# Patient Record
Sex: FEMALE | Race: OTHER | Employment: FULL TIME | URBAN - METROPOLITAN AREA
[De-identification: names, ages, dates, MRNs, and addresses within clinical notes are randomized per-mention and may not be internally consistent; named-entity substitution may affect disease eponyms.]

---

## 2018-07-17 ENCOUNTER — OFFICE VISIT (OUTPATIENT)
Dept: FAMILY MEDICINE CLINIC | Facility: CLINIC | Age: 43
End: 2018-07-17
Payer: COMMERCIAL

## 2018-07-17 VITALS
BODY MASS INDEX: 51.44 KG/M2 | RESPIRATION RATE: 17 BRPM | HEIGHT: 61.5 IN | DIASTOLIC BLOOD PRESSURE: 84 MMHG | HEART RATE: 91 BPM | OXYGEN SATURATION: 98 % | SYSTOLIC BLOOD PRESSURE: 132 MMHG | TEMPERATURE: 98 F | WEIGHT: 276 LBS

## 2018-07-17 DIAGNOSIS — Z13.29 SCREENING FOR ENDOCRINE, NUTRITIONAL, METABOLIC AND IMMUNITY DISORDER: ICD-10-CM

## 2018-07-17 DIAGNOSIS — Z80.3 FAMILY HISTORY OF BREAST CANCER: ICD-10-CM

## 2018-07-17 DIAGNOSIS — Z00.00 ENCOUNTER FOR ANNUAL PHYSICAL EXAMINATION EXCLUDING GYNECOLOGICAL EXAMINATION IN A PATIENT OLDER THAN 17 YEARS: Primary | ICD-10-CM

## 2018-07-17 DIAGNOSIS — Z12.39 SCREENING FOR BREAST CANCER: ICD-10-CM

## 2018-07-17 DIAGNOSIS — K64.9 HEMORRHOIDS, UNSPECIFIED HEMORRHOID TYPE: ICD-10-CM

## 2018-07-17 DIAGNOSIS — Z13.0 SCREENING FOR ENDOCRINE, NUTRITIONAL, METABOLIC AND IMMUNITY DISORDER: ICD-10-CM

## 2018-07-17 DIAGNOSIS — Z13.21 SCREENING FOR ENDOCRINE, NUTRITIONAL, METABOLIC AND IMMUNITY DISORDER: ICD-10-CM

## 2018-07-17 DIAGNOSIS — E66.01 OBESITY, MORBID, BMI 50 OR HIGHER (HCC): ICD-10-CM

## 2018-07-17 DIAGNOSIS — Z13.228 SCREENING FOR ENDOCRINE, NUTRITIONAL, METABOLIC AND IMMUNITY DISORDER: ICD-10-CM

## 2018-07-17 PROCEDURE — 99396 PREV VISIT EST AGE 40-64: CPT | Performed by: EMERGENCY MEDICINE

## 2018-07-17 NOTE — PROGRESS NOTES
Ary Wilkerson is a 39year old female who presents for a complete physical exam.   HPI:     Patient presents with:  Physical: Annual physical       Age: 39    1First day of last menstrual period (or first year of         menstruation, if throug f. Does your house have a working smoke detector? YES        g. Do you have firearms at home? NO        h. Have you ever had a mammogram?  NO     If yes, date of last mammogram:        i. How many sexual partners have you  had in the last 12 months?  1 Smokeless tobacco: Never Used                      Alcohol use: Yes              Comment: soc           REVIEW OF SYSTEMS:   GENERAL HEALTH: feels well, no fatigue.   SKIN: denies any unusual skin lesions or muscles tones, no joints abnormalities. SKIN: Normal color, turgor, no lesions, rashes or wounds. PSYCH: normal affect and mood.     ASSESSMENT AND PLAN:     Encounter for annual physical examination excluding gynecological examination in a patient older unspecified hemorrhoid type  -     SURGERY - INTERNAL    Other orders  -     Cancel: GALLO SCREENING BILAT (CPT=77067);  Future      · Arrange for genetic counseling  · Arrange for mammogram  · Have blood tests done  · Follow up with Surgery for hemorroids  ·

## 2018-07-17 NOTE — PATIENT INSTRUCTIONS
Thank you for choosing [facility]  To Do:  [facility]    · Arrange for genetic counseling  · Arrange for mammogram  · Have blood tests done  · Follow up with Surgery for hemorroids  · Follow up with weight loss clinic  · Overall decr make calcium on its own. That's why it's important to eat calcium-rich foods. Some foods are naturally rich in calcium. Others have calcium added (fortified). It's best to get calcium from the foods you eat.  But if you can't get enough, you may want to galileo guidelines for these, for women ages 36 to 52. Talk with your healthcare provider to make sure you’re up-to-date on what you need.   Screening Who needs it How often   Type 2 diabetes or prediabetes All adults beginning at age 39 and adults without symptoms exam at age 36 and eye exams every 2 to 4 years. If you have a chronic disease, ask your healthcare provider how often you should have your eyes examined. 4   Vaccine Who needs it How often   Chickenpox (varicella) All women in this age group who have no re Sexually transmitted infection prevention Women at increased risk for infection – talk with your healthcare provider At routine exams   Use of tobacco and the health effects it can cause All women in this age group Every exam   1[employer]

## 2018-07-18 ENCOUNTER — LAB ENCOUNTER (OUTPATIENT)
Dept: LAB | Age: 43
End: 2018-07-18
Attending: EMERGENCY MEDICINE
Payer: COMMERCIAL

## 2018-07-18 DIAGNOSIS — Z13.228 SCREENING FOR ENDOCRINE, NUTRITIONAL, METABOLIC AND IMMUNITY DISORDER: ICD-10-CM

## 2018-07-18 DIAGNOSIS — Z13.29 SCREENING FOR ENDOCRINE, NUTRITIONAL, METABOLIC AND IMMUNITY DISORDER: ICD-10-CM

## 2018-07-18 DIAGNOSIS — Z13.0 SCREENING FOR ENDOCRINE, NUTRITIONAL, METABOLIC AND IMMUNITY DISORDER: ICD-10-CM

## 2018-07-18 DIAGNOSIS — E66.01 OBESITY, MORBID, BMI 50 OR HIGHER (HCC): ICD-10-CM

## 2018-07-18 DIAGNOSIS — Z13.21 SCREENING FOR ENDOCRINE, NUTRITIONAL, METABOLIC AND IMMUNITY DISORDER: ICD-10-CM

## 2018-07-18 LAB
ALBUMIN SERPL-MCNC: 3.4 G/DL (ref 3.5–4.8)
ALP LIVER SERPL-CCNC: 59 U/L (ref 37–98)
ALT SERPL-CCNC: 30 U/L (ref 14–54)
AST SERPL-CCNC: 14 U/L (ref 15–41)
BASOPHILS # BLD AUTO: 0.03 X10(3) UL (ref 0–0.1)
BASOPHILS NFR BLD AUTO: 0.4 %
BILIRUB SERPL-MCNC: 0.6 MG/DL (ref 0.1–2)
BUN BLD-MCNC: 12 MG/DL (ref 8–20)
CALCIUM BLD-MCNC: 9.3 MG/DL (ref 8.3–10.3)
CHLORIDE: 106 MMOL/L (ref 101–111)
CHOLEST SMN-MCNC: 180 MG/DL (ref ?–200)
CO2: 28 MMOL/L (ref 22–32)
CREAT BLD-MCNC: 0.79 MG/DL (ref 0.55–1.02)
EOSINOPHIL # BLD AUTO: 0.17 X10(3) UL (ref 0–0.3)
EOSINOPHIL NFR BLD AUTO: 2 %
ERYTHROCYTE [DISTWIDTH] IN BLOOD BY AUTOMATED COUNT: 13.1 % (ref 11.5–16)
EST. AVERAGE GLUCOSE BLD GHB EST-MCNC: 148 MG/DL (ref 68–126)
GLUCOSE BLD-MCNC: 107 MG/DL (ref 70–99)
HBA1C MFR BLD HPLC: 6.8 % (ref ?–5.7)
HCT VFR BLD AUTO: 44 % (ref 34–50)
HDLC SERPL-MCNC: 50 MG/DL (ref 45–?)
HDLC SERPL: 3.6 {RATIO} (ref ?–4.44)
HGB BLD-MCNC: 13.7 G/DL (ref 12–16)
IMMATURE GRANULOCYTE COUNT: 0.03 X10(3) UL (ref 0–1)
IMMATURE GRANULOCYTE RATIO %: 0.4 %
LDLC SERPL CALC-MCNC: 111 MG/DL (ref ?–130)
LYMPHOCYTES # BLD AUTO: 2.06 X10(3) UL (ref 0.9–4)
LYMPHOCYTES NFR BLD AUTO: 24.7 %
M PROTEIN MFR SERPL ELPH: 7.7 G/DL (ref 6.1–8.3)
MCH RBC QN AUTO: 28.1 PG (ref 27–33.2)
MCHC RBC AUTO-ENTMCNC: 31.1 G/DL (ref 31–37)
MCV RBC AUTO: 90.3 FL (ref 81–100)
MONOCYTES # BLD AUTO: 0.47 X10(3) UL (ref 0.1–1)
MONOCYTES NFR BLD AUTO: 5.6 %
NEUTROPHIL ABS PRELIM: 5.59 X10 (3) UL (ref 1.3–6.7)
NEUTROPHILS # BLD AUTO: 5.59 X10(3) UL (ref 1.3–6.7)
NEUTROPHILS NFR BLD AUTO: 66.9 %
NONHDLC SERPL-MCNC: 130 MG/DL (ref ?–130)
PLATELET # BLD AUTO: 311 10(3)UL (ref 150–450)
POTASSIUM SERPL-SCNC: 4.3 MMOL/L (ref 3.6–5.1)
RBC # BLD AUTO: 4.87 X10(6)UL (ref 3.8–5.1)
RED CELL DISTRIBUTION WIDTH-SD: 43.5 FL (ref 35.1–46.3)
SODIUM SERPL-SCNC: 142 MMOL/L (ref 136–144)
TRIGL SERPL-MCNC: 94 MG/DL (ref ?–150)
TSI SER-ACNC: 2.98 MIU/ML (ref 0.35–5.5)
VLDLC SERPL CALC-MCNC: 19 MG/DL (ref 5–40)
WBC # BLD AUTO: 8.4 X10(3) UL (ref 4–13)

## 2018-07-18 PROCEDURE — 83036 HEMOGLOBIN GLYCOSYLATED A1C: CPT | Performed by: EMERGENCY MEDICINE

## 2018-07-18 PROCEDURE — 36415 COLL VENOUS BLD VENIPUNCTURE: CPT | Performed by: EMERGENCY MEDICINE

## 2018-07-18 PROCEDURE — 80061 LIPID PANEL: CPT | Performed by: EMERGENCY MEDICINE

## 2018-07-18 PROCEDURE — 80050 GENERAL HEALTH PANEL: CPT | Performed by: EMERGENCY MEDICINE

## 2018-07-20 ENCOUNTER — TELEPHONE (OUTPATIENT)
Dept: FAMILY MEDICINE CLINIC | Facility: CLINIC | Age: 43
End: 2018-07-20

## 2018-07-20 NOTE — TELEPHONE ENCOUNTER
----- Message from Meng Clayton MD sent at 7/19/2018  5:19 PM CDT -----  Pt needs OV for discussion of abnormal labs

## 2018-07-23 ENCOUNTER — OFFICE VISIT (OUTPATIENT)
Dept: FAMILY MEDICINE CLINIC | Facility: CLINIC | Age: 43
End: 2018-07-23
Payer: COMMERCIAL

## 2018-07-23 VITALS
BODY MASS INDEX: 51 KG/M2 | SYSTOLIC BLOOD PRESSURE: 126 MMHG | RESPIRATION RATE: 16 BRPM | HEART RATE: 86 BPM | WEIGHT: 275 LBS | OXYGEN SATURATION: 96 % | DIASTOLIC BLOOD PRESSURE: 88 MMHG

## 2018-07-23 DIAGNOSIS — E66.01 OBESITY, MORBID, BMI 50 OR HIGHER (HCC): ICD-10-CM

## 2018-07-23 DIAGNOSIS — E11.9 NEW ONSET TYPE 2 DIABETES MELLITUS (HCC): Primary | ICD-10-CM

## 2018-07-23 DIAGNOSIS — Z86.39 HISTORY OF HYPOTHYROIDISM: ICD-10-CM

## 2018-07-23 PROCEDURE — 99214 OFFICE O/P EST MOD 30 MIN: CPT | Performed by: EMERGENCY MEDICINE

## 2018-07-23 NOTE — PATIENT INSTRUCTIONS
Thank you for choosing [facility]  To Do:  FOR [address]    · Repeat thyroid test in 6 months  · Start Metformin 500 mg twice a day  · Daily blood sugar testing  · Arrange for diabetic eye exam  · Arrange for Diabetic teaching, Jose Geller of exercise  Exercise offers many benefits including:   · Exercise increases your metabolism (the speed at which your body burns calories). · Regular exercise can increase the amount of muscle in your body. Muscle burns calories faster than fat.  The more reasonable (or possible).   Make an action plan  Habits don’t change overnight. Setting your goals too high can leave you feeling discouraged if you can’t reach them. Be realistic. Choose one or two small changes you can make now.  Set an action plan for ho lose a 1/2 pound to 2 pounds a week. Then you’re more likely to lose fat rather than water or muscle. Fiction: “Skipping meals will help me lose weight.”  Fact: When you skip meals, you don’t give your body the energy it needs to work.  Hunger makes you mo your healthcare professional's instructions. How to Check Your Blood Sugar    Keeping track of how much sugar (glucose) is in your blood is an important part of self-care when you have diabetes.  This is also called self-monitoring of blood glucose ( glucose): Less than 180 mg/dL. Track your readings  Use a notebook, chart, or log book to keep track of your readings. Write down the date, time, and your blood sugar level numbers.  This helps you see patterns, such as high blood sugar after eating certai instructions. Exercise to Manage Your Blood Sugar    Being physically active every day can help you manage your blood sugar. That’s because an active lifestyle can improve your body’s ability to use insulin.  Daily activity can also help delay or pre exercise (also called strength training), makes muscles stronger. It also helps muscles use insulin better. Ask your healthcare provider whether this type of exercise is right for you.  If it is, your healthcare provider can help you work it in to your acti 6/1/2016  © 2674-8124 [employer] [address]. 1407 [facility], [address] [place] [address]. All rights reserved. This information is not intended as a substitute for professional medical care. Always follow your healthcare professional's instructions. your numbers. Also use your log to record things that might have affected your blood sugar.  Some examples include being sick, certain medicines, being physically active, feeling stressed, or skipping meals.   Lessons learned from your readings  Tracking yo

## 2018-07-23 NOTE — PROGRESS NOTES
Chief Complaint:   Patient presents with:  Lab Results: f/u    HPI:   This is a 39year old female     [address] [facility]  HBA1C elevated on last OV, 6.8  No polyuria   No weight loss  No polydipsia. Complains of generalized fatigue and decreased energy. bilateral  NECK: supple, no adenopathy, no thyromegaly  LUNGS: clear to auscultation, no RRW  CARDIO: RRR without murmur  EXTREMITIES: no cyanosis, clubbing or edema        Recent Results (from the past 336 hour(s))  -COMP METABOLIC PANEL (14)   Collection 1.00 x10(3) uL   Eosinophil Absolute 0.17 0.00 - 0.30 x10(3) uL   Basophil Absolute 0.03 0.00 - 0.10 x10(3) uL   Immature Granulocyte Absolute 0.03 0.00 - 1.00 x10(3) uL   Neutrophil % 66.9 %   Lymphocyte % 24.7 %   Monocyte % 5.6 %   Eosinophil % 2.0 %

## 2018-08-08 ENCOUNTER — OFFICE VISIT (OUTPATIENT)
Dept: SURGERY | Facility: CLINIC | Age: 43
End: 2018-08-08
Payer: COMMERCIAL

## 2018-08-08 VITALS — WEIGHT: 275 LBS | HEIGHT: 62 IN | BODY MASS INDEX: 50.61 KG/M2 | HEART RATE: 85 BPM | TEMPERATURE: 98 F

## 2018-08-08 DIAGNOSIS — K64.4 EXTERNAL HEMORRHOIDS WITHOUT COMPLICATION: Primary | ICD-10-CM

## 2018-08-08 PROCEDURE — 46600 DIAGNOSTIC ANOSCOPY SPX: CPT | Performed by: SURGERY

## 2018-08-08 PROCEDURE — 99243 OFF/OP CNSLTJ NEW/EST LOW 30: CPT | Performed by: SURGERY

## 2018-08-08 NOTE — H&P
New Patient Visit Note       Active Problems      1. External hemorrhoids without complication        Chief Complaint   Patient presents with:  Anal / Rectal Problem: New patient referred by Dr. Opal Beasley for possible hemorrhoids. c/o lump near anus.  Denies Smokeless tobacco: Never Used                        Alcohol use: Yes                Comment: soc    Drug use:  No                 Current Outpatient Prescriptions:  MetFORMIN HCl 500 MG Oral Tab Take 1 tablet ( rhonchi. She has no rales. Genitourinary: Rectal exam shows external hemorrhoid (nonthrombosed, about 4 mm, at 2:00).  Rectal exam shows no internal hemorrhoid, no fissure, no mass, no tenderness and anal tone normal. Pelvic exam was performed with patien

## 2018-08-09 ENCOUNTER — OFFICE VISIT (OUTPATIENT)
Dept: INTERNAL MEDICINE CLINIC | Facility: CLINIC | Age: 43
End: 2018-08-09
Payer: COMMERCIAL

## 2018-08-09 VITALS
HEIGHT: 62 IN | BODY MASS INDEX: 49.32 KG/M2 | RESPIRATION RATE: 16 BRPM | HEART RATE: 70 BPM | SYSTOLIC BLOOD PRESSURE: 118 MMHG | WEIGHT: 268 LBS | DIASTOLIC BLOOD PRESSURE: 80 MMHG

## 2018-08-09 DIAGNOSIS — E66.01 OBESITY, MORBID, BMI 50 OR HIGHER (HCC): ICD-10-CM

## 2018-08-09 DIAGNOSIS — E03.9 ACQUIRED HYPOTHYROIDISM: ICD-10-CM

## 2018-08-09 DIAGNOSIS — Z51.81 THERAPEUTIC DRUG MONITORING: Primary | ICD-10-CM

## 2018-08-09 DIAGNOSIS — E11.10 TYPE 2 DIABETES MELLITUS WITH KETOACIDOSIS WITHOUT COMA, WITHOUT LONG-TERM CURRENT USE OF INSULIN (HCC): ICD-10-CM

## 2018-08-09 PROCEDURE — 99203 OFFICE O/P NEW LOW 30 MIN: CPT | Performed by: NURSE PRACTITIONER

## 2018-08-09 PROCEDURE — 93000 ELECTROCARDIOGRAM COMPLETE: CPT | Performed by: NURSE PRACTITIONER

## 2018-08-09 RX ORDER — PHENTERMINE HYDROCHLORIDE 15 MG/1
15 CAPSULE ORAL EVERY MORNING
Qty: 30 CAPSULE | Refills: 0 | Status: SHIPPED | OUTPATIENT
Start: 2018-08-09 | End: 2018-09-22

## 2018-08-09 NOTE — PROGRESS NOTES
HISTORY OF PRESENT ILLNESS  Patient presents with:  Weight Check: referred by immediate care doctor. no weight loss meds. no regular exercise.     Marietta Rodas is a 39year old female new to our office today for initiation of medical weight loss prog conditions:negative  Migraines/seizures: +headaches  Glaucoma: negative   Depression/anxiety: negative  Constipation: negative  DVT: negative  Family or personal history of Pancreatic issues / Medullary Thyroid Cancer: negative  History of bariatric surger AST 14 (L) 07/18/2018   ALT 30 07/18/2018   BILT 0.6 07/18/2018   TP 7.7 07/18/2018   ALB 3.4 (L) 07/18/2018    07/18/2018   K 4.3 07/18/2018    07/18/2018   CO2 28.0 07/18/2018       Lab Results  Component Value Date    (H) 07/18/2018 10% in 6 months    Abnormal labs: a1c 6.8%  EKG in office completed- Normal axis, sinus bradycarida without any ST or T wave changes QTc 433  Low muscle mass- 12.2%--> strength training exercises encouraged     PLAN   Start exercising  MFP, make appt with

## 2018-08-09 NOTE — PROCEDURES
Procedure: Anoscopy    The patient was draped and exposed in the usual fashion. External visualization of the perineum and gluteal cleft was performed. Digital exam was performed with a well lubricated examining finger.     Diagnostic anoscopy was per

## 2018-09-10 ENCOUNTER — OFFICE VISIT (OUTPATIENT)
Dept: FAMILY MEDICINE CLINIC | Facility: CLINIC | Age: 43
End: 2018-09-10
Payer: COMMERCIAL

## 2018-09-10 VITALS
SYSTOLIC BLOOD PRESSURE: 120 MMHG | RESPIRATION RATE: 16 BRPM | BODY MASS INDEX: 48 KG/M2 | WEIGHT: 260 LBS | DIASTOLIC BLOOD PRESSURE: 84 MMHG | OXYGEN SATURATION: 98 % | HEART RATE: 74 BPM

## 2018-09-10 DIAGNOSIS — R39.89 AIR IN URINE: ICD-10-CM

## 2018-09-10 DIAGNOSIS — E11.9 CONTROLLED TYPE 2 DIABETES MELLITUS WITHOUT COMPLICATION, WITHOUT LONG-TERM CURRENT USE OF INSULIN (HCC): Primary | ICD-10-CM

## 2018-09-10 DIAGNOSIS — E66.01 MORBID OBESITY WITH BMI OF 45.0-49.9, ADULT (HCC): ICD-10-CM

## 2018-09-10 LAB
APPEARANCE: CLEAR
BILIRUBIN: NEGATIVE
GLUCOSE (URINE DIPSTICK): NEGATIVE MG/DL
KETONES (URINE DIPSTICK): NEGATIVE MG/DL
LEUKOCYTES: NEGATIVE
MULTISTIX LOT#: ABNORMAL NUMERIC
NITRITE, URINE: NEGATIVE
PH, URINE: 6 (ref 4.5–8)
PROTEIN (URINE DIPSTICK): NEGATIVE MG/DL
SPECIFIC GRAVITY: 1.03 (ref 1–1.03)
URINE-COLOR: YELLOW
UROBILINOGEN,SEMI-QN: 0.2 MG/DL (ref 0–1.9)

## 2018-09-10 PROCEDURE — 81003 URINALYSIS AUTO W/O SCOPE: CPT | Performed by: EMERGENCY MEDICINE

## 2018-09-10 PROCEDURE — 99214 OFFICE O/P EST MOD 30 MIN: CPT | Performed by: EMERGENCY MEDICINE

## 2018-09-10 PROCEDURE — 87086 URINE CULTURE/COLONY COUNT: CPT | Performed by: EMERGENCY MEDICINE

## 2018-09-10 NOTE — PATIENT INSTRUCTIONS
Thank you for choosing [facility]  To Do:  76 [facility]    · Arrange for DM education, Tomasa Estrella  · Arrange for DM eye exam  · Follow up in 3 months  · Continue follow up with Weight loss clinic  · Continue with weight loss  · Con professional's instructions. Managing Diabetes: The A1C Test       Healthy red blood cells have some glucose stuck to them. A high A1C means that unhealthy amounts of glucose are stuck to the cells.    What is the A1C test?  Using your meter helps yo professional medical care. Always follow your healthcare professional's instructions. Oral Medicines for Type 2 Diabetes  Diabetes pills can help to manage your blood sugar. These pills are not insulin.  They work to manage your blood sugar in severa are taking them.  Possible side effects include:   · Weight gain  · Extra fluid in your body and swelling  · Increased risk for heart failure  · Osteoporosis and increased risk for broken bones  Meglitinides  These pills increase your insulin for a short pe range. They also help your pancreas make more insulin and may help your muscles use insulin more effectively. Side effects depend on which type of combination you use.  Your healthcare provider can tell you more.     Watch for symptoms of hypoglycemia  Burt helps to measure or weigh the food you eat. Because the food label values are based on servings, you’ll need to know how many servings you eat at one sitting. Ounces: 2 to 3 ounces is about the size of your palm.  1 Cup: 1 cup (or a medium-sized piece to walk 10 miles a week), or a health goal (such as lowering your blood pressure). Choose a goal that is measurable and reasonable, so you know when you've reached it.  A goal of reaching a BMI of less than 25 is not always reasonable (or possible).   Make surgeons. These healthcare providers are trained to do surgery that aids in weight loss. A general healthcare provider can offer some treatments for weight loss. But a bariatric healthcare provider has more training in how to treat obesity.  These healthca begin an exercise program  · Find out if you need tests  · Help you make realistic weight loss goals  · Give you a nutrition plan  · Tell you to keep a food diary  · Find out if you need a weight-loss medicine  Your bariatric healthcare provider will also for a healthcare provider in your area. Date Last Reviewed: 4/1/2018  © 4511-4973 [employer] [address]. 1407 [facility], [facility] [place]. All rights reserved. This information is not intended as a substitute for professional medical care.  A

## 2018-09-10 NOTE — PROGRESS NOTES
Chief Complaint:   Patient presents with:  Diabetes: Follow up     HPI:   This is a 39year old female         [address]  Has changed diet and eating less carbs. More mindful in diet. Has not met with DM educator.  Has follwed up with weight loss c or higher ([facility].)     Diabetic acidosis, type II ([facility].)     Therapeutic drug monitoring     Controlled type 2 diabetes mellitus without complication, without long-term current use of insulin ([facility].)     Morbid obesity with BMI of 45.0-49.9, adult ([facility].)        R 10:23 AM   Result Value Ref Range    Urine Culture No Growth at 18-24 hrs.           ASSESSMENT AND PLAN:   Diagnoses and all orders for this visit:    Controlled type 2 diabetes mellitus without complication, without long-term current use of insulin ([facility]

## 2018-09-21 DIAGNOSIS — Z51.81 THERAPEUTIC DRUG MONITORING: ICD-10-CM

## 2018-09-21 DIAGNOSIS — E66.01 OBESITY, MORBID, BMI 50 OR HIGHER (HCC): ICD-10-CM

## 2018-09-21 NOTE — TELEPHONE ENCOUNTER
Requesting phentermine 15 mg  LOV: 8/9.18  RTC: 4 weeks  Last Relevant Labs:   Filled: 8/9/18 #30 with 0 refills    Future Appointments   Date Time Provider Olivia Lemons   10/1/2018  5:40 PM Sigrid Cruz APN [facility]     New patient,

## 2018-09-21 NOTE — TELEPHONE ENCOUNTER
Pt called states she is out of med states she is unable to come in until after 530 or saturdays pt booked appt for 10/1 pt is requesting a refill to last until then  Pt took last pill 2 days ago         Name of Medication: Phentermine HCl 15 MG Oral Cap

## 2018-09-22 RX ORDER — PHENTERMINE HYDROCHLORIDE 15 MG/1
15 CAPSULE ORAL EVERY MORNING
Qty: 30 CAPSULE | Refills: 0 | OUTPATIENT
Start: 2018-09-22 | End: 2018-10-01

## 2018-09-23 NOTE — TELEPHONE ENCOUNTER
[id number] Sammi Vigil, please tell patient that I will refill this medication only this 1 time, and that it will last her until her 10/1 appt  I would also tell her to book like 2 sat ahead of time, since they fill up quickly

## 2018-10-01 ENCOUNTER — TELEPHONE (OUTPATIENT)
Dept: INTERNAL MEDICINE CLINIC | Facility: CLINIC | Age: 43
End: 2018-10-01

## 2018-10-01 ENCOUNTER — OFFICE VISIT (OUTPATIENT)
Dept: INTERNAL MEDICINE CLINIC | Facility: CLINIC | Age: 43
End: 2018-10-01
Payer: COMMERCIAL

## 2018-10-01 VITALS
BODY MASS INDEX: 47.66 KG/M2 | RESPIRATION RATE: 16 BRPM | HEIGHT: 62 IN | WEIGHT: 259 LBS | DIASTOLIC BLOOD PRESSURE: 78 MMHG | HEART RATE: 68 BPM | SYSTOLIC BLOOD PRESSURE: 118 MMHG

## 2018-10-01 DIAGNOSIS — E66.01 OBESITY, MORBID, BMI 50 OR HIGHER (HCC): ICD-10-CM

## 2018-10-01 DIAGNOSIS — E11.9 CONTROLLED TYPE 2 DIABETES MELLITUS WITHOUT COMPLICATION, WITHOUT LONG-TERM CURRENT USE OF INSULIN (HCC): ICD-10-CM

## 2018-10-01 DIAGNOSIS — Z51.81 THERAPEUTIC DRUG MONITORING: Primary | ICD-10-CM

## 2018-10-01 PROCEDURE — 99214 OFFICE O/P EST MOD 30 MIN: CPT | Performed by: NURSE PRACTITIONER

## 2018-10-01 RX ORDER — PHENTERMINE HYDROCHLORIDE 15 MG/1
15 CAPSULE ORAL EVERY MORNING
Qty: 30 CAPSULE | Refills: 0 | Status: CANCELLED | OUTPATIENT
Start: 2018-10-01

## 2018-10-01 RX ORDER — PHENTERMINE HYDROCHLORIDE 37.5 MG/1
37.5 TABLET ORAL
Qty: 30 TABLET | Refills: 0 | Status: SHIPPED | OUTPATIENT
Start: 2018-10-01 | End: 2018-11-10

## 2018-10-01 NOTE — TELEPHONE ENCOUNTER
Called and left message for pt that medication was refilled and suggested that she schedule future appts asap so she can get the days that work for her.

## 2018-10-01 NOTE — TELEPHONE ENCOUNTER
Patient called left vmail stated she was returning albertina call - pt states ok to leave a detailed message

## 2018-10-01 NOTE — PATIENT INSTRUCTIONS
Keep up the great work!!     PLAN:  Continue with medications: metformin take 1,000mg in the AM and 1,000mg at night (2 tablet day and night), phentermine 37.5mg  Go to the lab for blood work   Follow up with me in 1 month  Schedule follow up appointments: your daily life.

## 2018-10-01 NOTE — PROGRESS NOTES
HISTORY OF PRESENT ILLNESS  Patient presents with:  Weight Check: down 9 lbs    Jeanette Queen is a 39year old female here for follow up with medical weight loss program for the treatment of overweight, obesity, or morbid obesity.  Patient has lost -# 7/10  Sleep hours and integrity: 7 Hours per night    MEDICAL HISTORY  PMH reviewed:   Cardiac disorders:negative   Diabetes: type II DM  Thyroid disease: hypothyroid   Renal disease: negative   Kidney stones: negative  Liver disease: negative  Joint-relat  (H) 07/18/2018    BUN 12 07/18/2018    CREATSERUM 0.79 07/18/2018    GFRNAA 91 07/18/2018    GFRAA 105 07/18/2018    CA 9.3 07/18/2018    ALKPHO 59 07/18/2018    AST 14 (L) 07/18/2018    ALT 30 07/18/2018    BILT 0.6 07/18/2018    TP 7.7 07/18/2018 management for success.  Discussed first goal of weight loss 5% in 3 months and 10% in 6 months    Abnormal labs: a1c 6.8%  EKG in office completed- Normal axis, sinus bradycarida without any ST or T wave changes QTc 433  Low muscle mass- 12.2%--> strength greek yogurt, cottage cheese, string cheese, hard boiled eggs  4.  Healthy snacks: peanut butter and apples, hummus and carrots, berries, nuts (1/4 cup), tuna and crackers    Protein Shakes: Premier protein or Core Power   Protein Bars: Rx Bars, Oatmega, Po

## 2018-10-02 ENCOUNTER — LAB ENCOUNTER (OUTPATIENT)
Dept: LAB | Age: 43
End: 2018-10-02
Attending: NURSE PRACTITIONER
Payer: COMMERCIAL

## 2018-10-02 DIAGNOSIS — E66.01 OBESITY, MORBID, BMI 50 OR HIGHER (HCC): ICD-10-CM

## 2018-10-02 DIAGNOSIS — E55.9 VITAMIN D DEFICIENCY: Primary | ICD-10-CM

## 2018-10-02 DIAGNOSIS — Z51.81 THERAPEUTIC DRUG MONITORING: ICD-10-CM

## 2018-10-02 DIAGNOSIS — Z86.39 HISTORY OF HYPOTHYROIDISM: ICD-10-CM

## 2018-10-02 PROCEDURE — 84443 ASSAY THYROID STIM HORMONE: CPT | Performed by: EMERGENCY MEDICINE

## 2018-10-02 PROCEDURE — 36415 COLL VENOUS BLD VENIPUNCTURE: CPT | Performed by: NURSE PRACTITIONER

## 2018-10-02 PROCEDURE — 82607 VITAMIN B-12: CPT | Performed by: NURSE PRACTITIONER

## 2018-10-02 PROCEDURE — 82306 VITAMIN D 25 HYDROXY: CPT | Performed by: NURSE PRACTITIONER

## 2018-10-02 PROCEDURE — 82397 CHEMILUMINESCENT ASSAY: CPT | Performed by: NURSE PRACTITIONER

## 2018-10-02 RX ORDER — ERGOCALCIFEROL 1.25 MG/1
50000 CAPSULE ORAL WEEKLY
Qty: 8 CAPSULE | Refills: 0 | Status: SHIPPED | OUTPATIENT
Start: 2018-10-02 | End: 2019-01-02

## 2018-10-16 NOTE — PROGRESS NOTES
Very elevated Leptin Level:  Leptin is a weight and appetite regulating hormone. Leptin resistance means there is a disconnect between brain and your body. .. The brain does not respond to satiety signals.  Ways to help improve leptin: eat protein, avoid pro

## 2018-10-18 ENCOUNTER — TELEPHONE (OUTPATIENT)
Dept: FAMILY MEDICINE CLINIC | Facility: CLINIC | Age: 43
End: 2018-10-18

## 2018-11-10 ENCOUNTER — OFFICE VISIT (OUTPATIENT)
Dept: INTERNAL MEDICINE CLINIC | Facility: CLINIC | Age: 43
End: 2018-11-10
Payer: COMMERCIAL

## 2018-11-10 VITALS
WEIGHT: 247 LBS | SYSTOLIC BLOOD PRESSURE: 120 MMHG | HEIGHT: 62 IN | BODY MASS INDEX: 45.45 KG/M2 | HEART RATE: 67 BPM | DIASTOLIC BLOOD PRESSURE: 80 MMHG | RESPIRATION RATE: 16 BRPM

## 2018-11-10 DIAGNOSIS — Z51.81 THERAPEUTIC DRUG MONITORING: ICD-10-CM

## 2018-11-10 DIAGNOSIS — E03.9 ACQUIRED HYPOTHYROIDISM: ICD-10-CM

## 2018-11-10 DIAGNOSIS — E66.01 OBESITY, MORBID, BMI 50 OR HIGHER (HCC): ICD-10-CM

## 2018-11-10 DIAGNOSIS — E11.9 CONTROLLED TYPE 2 DIABETES MELLITUS WITHOUT COMPLICATION, WITHOUT LONG-TERM CURRENT USE OF INSULIN (HCC): Primary | ICD-10-CM

## 2018-11-10 PROCEDURE — 99214 OFFICE O/P EST MOD 30 MIN: CPT | Performed by: NURSE PRACTITIONER

## 2018-11-10 RX ORDER — PHENTERMINE HYDROCHLORIDE 37.5 MG/1
37.5 TABLET ORAL
Qty: 30 TABLET | Refills: 0 | Status: SHIPPED | OUTPATIENT
Start: 2018-11-10 | End: 2019-01-02

## 2018-11-10 RX ORDER — FLUCONAZOLE 150 MG/1
150 TABLET ORAL ONCE
Qty: 2 TABLET | Refills: 0 | Status: SHIPPED | OUTPATIENT
Start: 2018-11-10 | End: 2018-11-10

## 2018-11-10 NOTE — PROGRESS NOTES
HISTORY OF PRESENT ILLNESS  Patient presents with:  Weight Check: lost 12 pounds    Mansoor Gilbert is a 39year old female here for follow up with medical weight loss program for the treatment of overweight, obesity, or morbid obesity.  Patient has los none  Exercise/Activity: no exercise   Stress level: 7/10  Sleep hours and integrity: 7 Hours per night    MEDICAL HISTORY  PMH reviewed:   Cardiac disorders:negative   Diabetes: type II DM  Thyroid disease: hypothyroid   Renal disease: negative   Kidney s 07/18/2018     Lab Results   Component Value Date     (H) 07/18/2018    BUN 12 07/18/2018    CREATSERUM 0.79 07/18/2018    GFRNAA 91 07/18/2018    GFRAA 105 07/18/2018    CA 9.3 07/18/2018    ALKPHO 59 07/18/2018    AST 14 (L) 07/18/2018    ALT 30 0 lbs  Initial Weight Date: 08/09/18  Today's Weight: 247 lbs  5% Goal: 13.4  10% Goal: 26.8  Total Weight Loss: 21 lbs     Initial consult: 275 lbs on 8/9/2018, Down 12  Lb: 21 lbs total     Reviewed  [facility] patient contract.  Readiness for Lifestyle change: 10 today: b12, vitamin d,  Leptin (didn't get labs drawn)     Patient Instructions   Keep up the great work! !     PLAN:  Continue with medications: phentermine 37.5mg, jardance 10mg (daily), and metformin    Follow up with me in 1 month  Schedule follow up ap that you can do at home! 3. \"Calm\" or \"Headspace\" which helps with mindfulness, meditation, clarity, sleep, and clarence to your daily life. Return in about 4 weeks (around 12/8/2018) for weight management. Patient verbalizes understanding.

## 2018-11-12 ENCOUNTER — TELEPHONE (OUTPATIENT)
Dept: INTERNAL MEDICINE CLINIC | Facility: CLINIC | Age: 43
End: 2018-11-12

## 2018-11-12 NOTE — TELEPHONE ENCOUNTER
PA request received for Jardiance. Geoffrey Lopez [id number].   PA submitted, awaiting approval or denial.     Please advise the dispensing pharmacy to [address] [place] [address] at [phone] for assistance  Called bill, ,they state medication goes thro

## 2018-12-03 ENCOUNTER — OFFICE VISIT (OUTPATIENT)
Dept: FAMILY MEDICINE CLINIC | Facility: CLINIC | Age: 43
End: 2018-12-03
Payer: COMMERCIAL

## 2018-12-03 VITALS
RESPIRATION RATE: 16 BRPM | SYSTOLIC BLOOD PRESSURE: 120 MMHG | HEIGHT: 62 IN | BODY MASS INDEX: 45.08 KG/M2 | DIASTOLIC BLOOD PRESSURE: 76 MMHG | HEART RATE: 88 BPM | TEMPERATURE: 99 F | OXYGEN SATURATION: 98 % | WEIGHT: 245 LBS

## 2018-12-03 DIAGNOSIS — J02.9 ACUTE PHARYNGITIS, UNSPECIFIED ETIOLOGY: ICD-10-CM

## 2018-12-03 DIAGNOSIS — N76.0 ACUTE VAGINITIS: Primary | ICD-10-CM

## 2018-12-03 PROCEDURE — 87480 CANDIDA DNA DIR PROBE: CPT | Performed by: NURSE PRACTITIONER

## 2018-12-03 PROCEDURE — 87880 STREP A ASSAY W/OPTIC: CPT | Performed by: NURSE PRACTITIONER

## 2018-12-03 PROCEDURE — 99213 OFFICE O/P EST LOW 20 MIN: CPT | Performed by: NURSE PRACTITIONER

## 2018-12-03 PROCEDURE — 87491 CHLMYD TRACH DNA AMP PROBE: CPT | Performed by: NURSE PRACTITIONER

## 2018-12-03 PROCEDURE — 87510 GARDNER VAG DNA DIR PROBE: CPT | Performed by: NURSE PRACTITIONER

## 2018-12-03 PROCEDURE — 87660 TRICHOMONAS VAGIN DIR PROBE: CPT | Performed by: NURSE PRACTITIONER

## 2018-12-03 PROCEDURE — 81003 URINALYSIS AUTO W/O SCOPE: CPT | Performed by: NURSE PRACTITIONER

## 2018-12-03 PROCEDURE — 87591 N.GONORRHOEAE DNA AMP PROB: CPT | Performed by: NURSE PRACTITIONER

## 2018-12-03 RX ORDER — FLUCONAZOLE 150 MG/1
150 TABLET ORAL DAILY
Qty: 3 TABLET | Refills: 0 | Status: SHIPPED | OUTPATIENT
Start: 2018-12-03 | End: 2018-12-06

## 2018-12-03 NOTE — PROGRESS NOTES
HPI:   Stefano Ty is a 39year old female who presents for vaginal symptoms for 6 days. Reports history of diabetes and started new medication Jardiance.    Took diflucan Thursday night - with some relief   LMP:Hystrectomy  2015  Exposure to STDs: /76   Pulse 88   Temp 98.7 °F (37.1 °C) (Oral)   Resp 16   Ht 62\"   Wt 245 lb   SpO2 98%   BMI 44.81 kg/m²   Body mass index is 44.81 kg/m².    GENERAL: well developed, well nourished,in no apparent distress, pleasant patient  HEENT: Normocephalic,

## 2018-12-03 NOTE — PATIENT INSTRUCTIONS
Preventing Vaginal Infection  These steps can help you stay comfortable during treatment of a vaginal infection. They also help prevent vaginal infections in the future.   Keeping a healthy balance  Factors that change the normal balance in the vagina can © 7272-1623 [employer] [address]. 1407 [facility], [facility] [place]. All rights reserved. This information is not intended as a substitute for professional medical care. Always follow your healthcare professional's instructions.

## 2018-12-04 ENCOUNTER — TELEPHONE (OUTPATIENT)
Dept: FAMILY MEDICINE CLINIC | Facility: CLINIC | Age: 43
End: 2018-12-04

## 2018-12-04 NOTE — TELEPHONE ENCOUNTER
----- Message from MATTHEW Aldridge-GENESIS sent at 12/4/2018  3:48 PM CST -----  Positive for bacterial vaginosis -sent new prescription take it twice daily for 7 days -no alcohol -bad side effect will make you  very sick

## 2018-12-05 NOTE — TELEPHONE ENCOUNTER
Pt called back regarding lab results. Pt is at work, Pt said we can leave her a detailed voicemail message. Pt is at work and might not answer and she does not want to play phone tag with us.

## 2018-12-05 NOTE — TELEPHONE ENCOUNTER
Called patient and spoke with her. Advised her of results and POC below. Patient states understanding. All questions answered for patient and patient expressed understanding.

## 2019-01-02 ENCOUNTER — OFFICE VISIT (OUTPATIENT)
Dept: INTERNAL MEDICINE CLINIC | Facility: CLINIC | Age: 44
End: 2019-01-02
Payer: COMMERCIAL

## 2019-01-02 ENCOUNTER — LAB ENCOUNTER (OUTPATIENT)
Dept: LAB | Age: 44
End: 2019-01-02
Attending: NURSE PRACTITIONER
Payer: COMMERCIAL

## 2019-01-02 VITALS
SYSTOLIC BLOOD PRESSURE: 110 MMHG | DIASTOLIC BLOOD PRESSURE: 78 MMHG | RESPIRATION RATE: 16 BRPM | HEIGHT: 62 IN | BODY MASS INDEX: 45.08 KG/M2 | WEIGHT: 245 LBS | HEART RATE: 88 BPM

## 2019-01-02 DIAGNOSIS — E11.9 CONTROLLED TYPE 2 DIABETES MELLITUS WITHOUT COMPLICATION, WITHOUT LONG-TERM CURRENT USE OF INSULIN (HCC): ICD-10-CM

## 2019-01-02 DIAGNOSIS — E03.9 ACQUIRED HYPOTHYROIDISM: ICD-10-CM

## 2019-01-02 DIAGNOSIS — E66.01 OBESITY, MORBID, BMI 50 OR HIGHER (HCC): ICD-10-CM

## 2019-01-02 DIAGNOSIS — E11.10 TYPE 2 DIABETES MELLITUS WITH KETOACIDOSIS WITHOUT COMA, WITHOUT LONG-TERM CURRENT USE OF INSULIN (HCC): ICD-10-CM

## 2019-01-02 DIAGNOSIS — Z51.81 THERAPEUTIC DRUG MONITORING: ICD-10-CM

## 2019-01-02 DIAGNOSIS — Z51.81 THERAPEUTIC DRUG MONITORING: Primary | ICD-10-CM

## 2019-01-02 LAB
EST. AVERAGE GLUCOSE BLD GHB EST-MCNC: 128 MG/DL (ref 68–126)
HBA1C MFR BLD HPLC: 6.1 % (ref ?–5.7)

## 2019-01-02 PROCEDURE — 99213 OFFICE O/P EST LOW 20 MIN: CPT | Performed by: NURSE PRACTITIONER

## 2019-01-02 PROCEDURE — 83036 HEMOGLOBIN GLYCOSYLATED A1C: CPT | Performed by: NURSE PRACTITIONER

## 2019-01-02 PROCEDURE — 36415 COLL VENOUS BLD VENIPUNCTURE: CPT | Performed by: NURSE PRACTITIONER

## 2019-01-02 RX ORDER — PHENTERMINE HYDROCHLORIDE 37.5 MG/1
37.5 TABLET ORAL
Qty: 30 TABLET | Refills: 0 | Status: SHIPPED | OUTPATIENT
Start: 2019-01-02 | End: 2019-03-02

## 2019-01-02 NOTE — PROGRESS NOTES
HISTORY OF PRESENT ILLNESS  Patient presents with:  Weight Check: lost 2 pounds    Vane Nugent is a 39year old female here for follow up with medical weight loss program for the treatment of overweight, obesity, or morbid obesity.  Patient has lost starting new job ([employer])  Marital status: single  2 boys- living with 1 (25 yrs old)   Support: yes  Tobacco use: none  ETOH use: none  Supplements: none  Exercise/Activity: no exercise   Stress level: 7/10  Sleep hours and integrity: 7 Hours per n 07/18/2018    CREATSERUM 0.79 07/18/2018    GFRNAA 91 07/18/2018    GFRAA 105 07/18/2018    CA 9.3 07/18/2018    ALKPHO 59 07/18/2018    AST 14 (L) 07/18/2018    ALT 30 07/18/2018    BILT 0.6 07/18/2018    TP 7.7 07/18/2018    ALB 3.4 (L) 07/18/2018    NA Surgery interest: 0/10. Counseled on comprehensive weight loss plan including attention to nutrition, exercise and behavior/stress management for success.  Discussed first goal of weight loss 5% in 3 months and 10% in 6 months    Abnormal labs: a1c 6.8% a1c today  Think about adding injectable medication (ie.  annabelle Dai- once a weekly medication)  Follow up with me in 1 month  Schedule follow up appointments:  Dietitian/nutritionist      Please try to work on the following dietary changes: clarity, sleep, and clarence to your daily life. Return in about 4 weeks (around 1/30/2019) for weight management. Patient verbalizes understanding.     MERY Sloan

## 2019-01-02 NOTE — PATIENT INSTRUCTIONS
PLAN:  Continue with medications: phentermine 37.5mg, and metformin 500mg (1 tablet 2 times per day)   Repeat a1c today  Think about adding injectable medication (ie.  annabelle Soto- once a weekly medication)  Follow up with me in 1 month  Sched your day and that you can do at home! 3. \"Calm\" or \"Headspace\" which helps with mindfulness, meditation, clarity, sleep, and clarence to your daily life.

## 2019-01-04 ENCOUNTER — TELEPHONE (OUTPATIENT)
Dept: INTERNAL MEDICINE CLINIC | Facility: CLINIC | Age: 44
End: 2019-01-04

## 2019-01-04 RX ORDER — SEMAGLUTIDE 1.34 MG/ML
0.25 INJECTION, SOLUTION SUBCUTANEOUS WEEKLY
Qty: 1 PEN | Refills: 1 | Status: SHIPPED | OUTPATIENT
Start: 2019-01-04 | End: 2019-03-02

## 2019-01-07 ENCOUNTER — NURSE ONLY (OUTPATIENT)
Dept: INTERNAL MEDICINE CLINIC | Facility: CLINIC | Age: 44
End: 2019-01-07
Payer: COMMERCIAL

## 2019-01-07 NOTE — PROGRESS NOTES
Patient is new start with Ozempic. She brought her prescription with. Discussed how to use pen and patient demonstrated use of pen by injecting herself with medication in abdomen subcutaneously. She will call with any complaints.   To stay on Ozempic 0.25

## 2019-01-15 ENCOUNTER — OFFICE VISIT (OUTPATIENT)
Dept: FAMILY MEDICINE CLINIC | Facility: CLINIC | Age: 44
End: 2019-01-15
Payer: COMMERCIAL

## 2019-01-15 VITALS
HEIGHT: 62 IN | BODY MASS INDEX: 44.35 KG/M2 | SYSTOLIC BLOOD PRESSURE: 126 MMHG | RESPIRATION RATE: 17 BRPM | OXYGEN SATURATION: 99 % | DIASTOLIC BLOOD PRESSURE: 86 MMHG | HEART RATE: 82 BPM | WEIGHT: 241 LBS

## 2019-01-15 DIAGNOSIS — R31.9 HEMATURIA, UNSPECIFIED TYPE: Primary | ICD-10-CM

## 2019-01-15 PROBLEM — E66.01 OBESITY, MORBID, BMI 50 OR HIGHER (HCC): Status: RESOLVED | Noted: 2018-07-17 | Resolved: 2019-01-15

## 2019-01-15 LAB
BILIRUBIN: NEGATIVE
GLUCOSE (URINE DIPSTICK): NEGATIVE MG/DL
KETONES (URINE DIPSTICK): NEGATIVE MG/DL
LEUKOCYTES: NEGATIVE
MULTISTIX LOT#: ABNORMAL NUMERIC
NITRITE, URINE: NEGATIVE
PH, URINE: 6 (ref 4.5–8)
PROTEIN (URINE DIPSTICK): NEGATIVE MG/DL
SPECIFIC GRAVITY: 1.03 (ref 1–1.03)
UROBILINOGEN,SEMI-QN: 0.2 MG/DL (ref 0–1.9)

## 2019-01-15 PROCEDURE — 87086 URINE CULTURE/COLONY COUNT: CPT | Performed by: EMERGENCY MEDICINE

## 2019-01-15 PROCEDURE — 99214 OFFICE O/P EST MOD 30 MIN: CPT | Performed by: EMERGENCY MEDICINE

## 2019-01-15 PROCEDURE — 81003 URINALYSIS AUTO W/O SCOPE: CPT | Performed by: EMERGENCY MEDICINE

## 2019-01-15 RX ORDER — CIPROFLOXACIN 500 MG/1
500 TABLET, FILM COATED ORAL 2 TIMES DAILY
Qty: 14 TABLET | Refills: 0 | Status: SHIPPED | OUTPATIENT
Start: 2019-01-15 | End: 2019-01-22

## 2019-01-15 NOTE — PROGRESS NOTES
Chief Complaint:   Patient presents with:  Back Pain: C/o mid back pain radiates towards the front and spotting X 3 months    HPI:   This is a 39year old female     Blood when wiping after urination. Ongoing for the past 3 months. Sx are episodic.  No pain Rfl: 0     No current facility-administered medications on file prior to visit.     Counseling given: Not Answered         PROBLEM LIST     Patient Active Problem List:     S/P hysterectomy     Acquired hypothyroidism     Family history of breast cancer 1.030 1.005 - 1.030    OCCULT BLOOD Small Negative    PH, URINE 6.0 4.5 - 8.0    PROTEIN (URINE DIPSTICK) Negative Negative/Trace mg/dL    UROBILINOGEN,SEMI-QN 0.2 0.0 - 1.9 mg/dL    NITRITE, URINE Negative Negative    LEUKOCYTES Negative Negative    APPEA

## 2019-01-15 NOTE — PATIENT INSTRUCTIONS
Thank you for choosing [facility]  To Do:  FOR EFREN RANDOLPH    · Take cirpfloxacin as directed  · Arrange for ultrasound  · Follow up in 1-2 weeks for pelvic exam and possible PAP and annual physical  · Continue with weight loss clinic depending on the cause. Date Last Reviewed: 12/2/2016  © 6326-1663 [employer] [address]. 1407 [facility], [address] [place]. All rights reserved. This information is not intended as a substitute for professional medical care.  Always follow you diseases of the kidneys  · Sickle cell anemia  · Vigorous exercise  · Endometriosis  Date Last Reviewed: 12/1/2016  © 5950-8913 [employer] [address]. 1407 [facility], [address] [place] [address]. All rights reserved.  This information is not intended as a will be told of any new findings that may affect your care.   When to seek medical advice  Call your healthcare provider right away if any of these occur:  · Bright red blood or blood clots in the urine (if you did not have this before)  · Weakness, dizzine

## 2019-01-17 ENCOUNTER — HOSPITAL ENCOUNTER (OUTPATIENT)
Dept: ULTRASOUND IMAGING | Age: 44
Discharge: HOME | End: 2019-01-17
Attending: EMERGENCY MEDICINE
Payer: COMMERCIAL

## 2019-01-17 DIAGNOSIS — R31.9 HEMATURIA, UNSPECIFIED TYPE: ICD-10-CM

## 2019-01-17 PROCEDURE — 76770 US EXAM ABDO BACK WALL COMP: CPT | Performed by: EMERGENCY MEDICINE

## 2019-01-18 ENCOUNTER — TELEPHONE (OUTPATIENT)
Dept: FAMILY MEDICINE CLINIC | Facility: CLINIC | Age: 44
End: 2019-01-18

## 2019-01-18 ENCOUNTER — MED REC SCAN ONLY (OUTPATIENT)
Dept: FAMILY MEDICINE CLINIC | Facility: CLINIC | Age: 44
End: 2019-01-18

## 2019-01-18 DIAGNOSIS — N20.0 KIDNEY STONE: Primary | ICD-10-CM

## 2019-01-18 NOTE — TELEPHONE ENCOUNTER
----- Message from Marco Antonio Torres MD sent at 1/18/2019  6:09 AM CST -----    US KIDNEY/BLADDER  + stone to kidney, this may or may not be causing her sx of hematuria  Pls refer patient to urology, Dr. Andrey Harrell MD  Urology  [facility]

## 2019-01-18 NOTE — TELEPHONE ENCOUNTER
Patient signed medical records authorization form for the below Facility to disclose health information to [facility]:      Facility / Provider Name: [facility] Phone:   Facility Fax: [phone]    CORDELIA sent to scanning.  Fax confirmation @ 4:22p

## 2019-01-18 NOTE — TELEPHONE ENCOUNTER
Spoke with pt regarding results and instructions listed below. Pt verbalizes understanding. mychart msg sent as requested.

## 2019-01-28 ENCOUNTER — LAB ENCOUNTER (OUTPATIENT)
Dept: LAB | Age: 44
End: 2019-01-28
Attending: PHYSICIAN ASSISTANT
Payer: COMMERCIAL

## 2019-01-28 DIAGNOSIS — N20.0 KIDNEY STONES: ICD-10-CM

## 2019-01-28 DIAGNOSIS — R10.9 FLANK PAIN: ICD-10-CM

## 2019-01-28 DIAGNOSIS — R31.0 GROSS HEMATURIA: ICD-10-CM

## 2019-01-28 LAB
BUN BLD-MCNC: 12 MG/DL (ref 8–20)
CREAT BLD-MCNC: 0.94 MG/DL (ref 0.55–1.02)

## 2019-01-28 PROCEDURE — 81001 URINALYSIS AUTO W/SCOPE: CPT | Performed by: PHYSICIAN ASSISTANT

## 2019-01-28 PROCEDURE — 87086 URINE CULTURE/COLONY COUNT: CPT | Performed by: PHYSICIAN ASSISTANT

## 2019-01-28 PROCEDURE — 82565 ASSAY OF CREATININE: CPT

## 2019-01-28 PROCEDURE — 84520 ASSAY OF UREA NITROGEN: CPT

## 2019-01-28 PROCEDURE — 36415 COLL VENOUS BLD VENIPUNCTURE: CPT

## 2019-02-02 ENCOUNTER — HOSPITAL ENCOUNTER (OUTPATIENT)
Dept: MAMMOGRAPHY | Age: 44
Discharge: HOME | End: 2019-02-02
Attending: EMERGENCY MEDICINE
Payer: COMMERCIAL

## 2019-02-02 DIAGNOSIS — Z80.3 FAMILY HISTORY OF BREAST CANCER: ICD-10-CM

## 2019-02-02 DIAGNOSIS — Z12.39 SCREENING FOR BREAST CANCER: ICD-10-CM

## 2019-02-02 PROCEDURE — 77063 BREAST TOMOSYNTHESIS BI: CPT | Performed by: EMERGENCY MEDICINE

## 2019-02-02 PROCEDURE — 77067 SCR MAMMO BI INCL CAD: CPT | Performed by: EMERGENCY MEDICINE

## 2019-02-04 PROBLEM — Z86.39 HISTORY OF HYPOTHYROIDISM: Status: ACTIVE | Noted: 2019-02-04

## 2019-02-04 PROBLEM — L70.0 ACNE VULGARIS: Status: ACTIVE | Noted: 2019-02-04

## 2019-02-04 PROBLEM — E78.5 DYSLIPIDEMIA: Status: ACTIVE | Noted: 2019-02-04

## 2019-02-27 PROCEDURE — 83498 ASY HYDROXYPROGESTERONE 17-D: CPT | Performed by: INTERNAL MEDICINE

## 2019-02-27 PROCEDURE — 86800 THYROGLOBULIN ANTIBODY: CPT | Performed by: INTERNAL MEDICINE

## 2019-02-27 PROCEDURE — 86376 MICROSOMAL ANTIBODY EACH: CPT | Performed by: INTERNAL MEDICINE

## 2019-02-27 PROCEDURE — 84403 ASSAY OF TOTAL TESTOSTERONE: CPT | Performed by: INTERNAL MEDICINE

## 2019-02-27 PROCEDURE — 84402 ASSAY OF FREE TESTOSTERONE: CPT | Performed by: INTERNAL MEDICINE

## 2019-03-02 ENCOUNTER — OFFICE VISIT (OUTPATIENT)
Dept: INTERNAL MEDICINE CLINIC | Facility: CLINIC | Age: 44
End: 2019-03-02
Payer: COMMERCIAL

## 2019-03-02 VITALS
RESPIRATION RATE: 16 BRPM | HEART RATE: 86 BPM | WEIGHT: 238 LBS | DIASTOLIC BLOOD PRESSURE: 70 MMHG | SYSTOLIC BLOOD PRESSURE: 120 MMHG | BODY MASS INDEX: 43.79 KG/M2 | HEIGHT: 62 IN

## 2019-03-02 DIAGNOSIS — E66.01 OBESITY, MORBID, BMI 50 OR HIGHER (HCC): ICD-10-CM

## 2019-03-02 DIAGNOSIS — E11.9 CONTROLLED TYPE 2 DIABETES MELLITUS WITHOUT COMPLICATION, WITHOUT LONG-TERM CURRENT USE OF INSULIN (HCC): ICD-10-CM

## 2019-03-02 DIAGNOSIS — Z51.81 THERAPEUTIC DRUG MONITORING: Primary | ICD-10-CM

## 2019-03-02 DIAGNOSIS — E03.9 ACQUIRED HYPOTHYROIDISM: ICD-10-CM

## 2019-03-02 DIAGNOSIS — E78.5 DYSLIPIDEMIA: ICD-10-CM

## 2019-03-02 PROCEDURE — 99214 OFFICE O/P EST MOD 30 MIN: CPT | Performed by: NURSE PRACTITIONER

## 2019-03-02 RX ORDER — SEMAGLUTIDE 1.34 MG/ML
0.25 INJECTION, SOLUTION SUBCUTANEOUS WEEKLY
Qty: 1 PEN | Refills: 1 | Status: SHIPPED | OUTPATIENT
Start: 2019-03-02 | End: 2019-04-03

## 2019-03-02 RX ORDER — PHENTERMINE HYDROCHLORIDE 37.5 MG/1
37.5 TABLET ORAL
Qty: 30 TABLET | Refills: 0 | Status: SHIPPED | OUTPATIENT
Start: 2019-03-02 | End: 2019-04-03

## 2019-03-02 NOTE — PATIENT INSTRUCTIONS
Keep up the great work! !     PLAN:  Continue with medications: ozempic 0.5mg X 4 weeks and then will increase to 1mg (stay at this dose)  Follow up with me in 1 month  Schedule follow up appointments:  Dietitian/nutritionist      Please try to work on the mindfulness, meditation, clarity, sleep, and clarence to your daily life.

## 2019-03-02 NOTE — PROGRESS NOTES
HISTORY OF PRESENT ILLNESS  Patient presents with:  Weight Check: down 7 lbs    Claudio Brito is a 38year old female here for follow up with medical weight loss program for the treatment of overweight, obesity, or morbid obesity.  Patient has lost -# level: 7/10  Sleep hours and integrity: 7 Hours per night    MEDICAL HISTORY  PMH reviewed:   Cardiac disorders:negative   Diabetes: type II DM  Thyroid disease: hypothyroid   Renal disease: negative   Kidney stones: negative  Liver disease: negative  Join TP 7.7 07/18/2018    ALB 3.4 (L) 07/18/2018     07/18/2018    K 4.3 07/18/2018     07/18/2018    CO2 28.0 07/18/2018     Lab Results   Component Value Date     (H) 01/02/2019    A1C 6.1 (H) 01/02/2019     Lab Results   Component Value Da management for success.  Discussed first goal of weight loss 5% in 3 months and 10% in 6 months    Abnormal labs: a1c 6.1% on 1/2019  EKG in office completed- Normal axis, sinus bradycarida without any ST or T wave changes QTc 433  Low muscle mass- 12.2%--> consumption if soda/juice drinker  2. Eat breakfast daily (within 1 hour)  3. Focus on protein: (15-30 grams with each meal) ie. greek yogurt, cottage cheese, string cheese, hard boiled eggs  4.  Healthy snacks: peanut butter and apples, hummus and carrots,

## 2019-03-04 PROBLEM — L68.0 HIRSUTISM: Status: ACTIVE | Noted: 2019-03-04

## 2019-03-04 PROBLEM — E06.3 HASHIMOTO'S THYROIDITIS: Status: ACTIVE | Noted: 2019-03-04

## 2019-04-03 ENCOUNTER — OFFICE VISIT (OUTPATIENT)
Dept: INTERNAL MEDICINE CLINIC | Facility: CLINIC | Age: 44
End: 2019-04-03
Payer: COMMERCIAL

## 2019-04-03 ENCOUNTER — TELEPHONE (OUTPATIENT)
Dept: INTERNAL MEDICINE CLINIC | Facility: CLINIC | Age: 44
End: 2019-04-03

## 2019-04-03 VITALS
RESPIRATION RATE: 16 BRPM | HEART RATE: 80 BPM | SYSTOLIC BLOOD PRESSURE: 126 MMHG | DIASTOLIC BLOOD PRESSURE: 70 MMHG | WEIGHT: 236 LBS | HEIGHT: 62 IN | BODY MASS INDEX: 43.43 KG/M2

## 2019-04-03 DIAGNOSIS — E66.01 OBESITY, MORBID, BMI 50 OR HIGHER (HCC): ICD-10-CM

## 2019-04-03 DIAGNOSIS — E78.5 DYSLIPIDEMIA: ICD-10-CM

## 2019-04-03 DIAGNOSIS — Z51.81 THERAPEUTIC DRUG MONITORING: Primary | ICD-10-CM

## 2019-04-03 DIAGNOSIS — E11.9 CONTROLLED TYPE 2 DIABETES MELLITUS WITHOUT COMPLICATION, WITHOUT LONG-TERM CURRENT USE OF INSULIN (HCC): ICD-10-CM

## 2019-04-03 DIAGNOSIS — E03.9 ACQUIRED HYPOTHYROIDISM: ICD-10-CM

## 2019-04-03 PROCEDURE — 99213 OFFICE O/P EST LOW 20 MIN: CPT | Performed by: NURSE PRACTITIONER

## 2019-04-03 RX ORDER — SEMAGLUTIDE 1.34 MG/ML
1 INJECTION, SOLUTION SUBCUTANEOUS WEEKLY
Qty: 6 PEN | Refills: 0 | Status: SHIPPED | OUTPATIENT
Start: 2019-04-03 | End: 2019-09-14

## 2019-04-03 RX ORDER — PHENTERMINE HYDROCHLORIDE 37.5 MG/1
37.5 TABLET ORAL
Qty: 30 TABLET | Refills: 0 | OUTPATIENT
Start: 2019-04-03 | End: 2019-05-14

## 2019-04-03 NOTE — PROGRESS NOTES
[facility] for Diet and Exercise     /70   Pulse 80   Resp 16   Ht 62\"   Wt 236 lb   BMI 43.16 kg/m²   Body mass index is 43.16 kg/m².     Wt Readings from Last 6 Encounters:  04/03/19 : 236 lb  03/04/19 : 242 lb  03/02/19 : 238 lb  02/0

## 2019-04-03 NOTE — PROGRESS NOTES
HISTORY OF PRESENT ILLNESS  Patient presents with:  Weight Check: lost 2 pounds    Michelle Cabot is a 38year old female here for follow up with medical weight loss program for the treatment of overweight, obesity, or morbid obesity.  Patient has lost +headaches  Glaucoma: negative   Depression/anxiety: negative  Constipation: negative  DVT: negative  Family or personal history of Pancreatic issues / Medullary Thyroid Cancer: negative  History of bariatric surgery: negative    REVIEW OF SYSTEMS  GENERAL 07/18/2018    TCHDLRATIO 3.60 07/18/2018    Hilario 130 (H) 07/18/2018     Lab Results   Component Value Date    T4F 1.19 02/27/2019    TSH 2.557 02/27/2019     Lab Results   Component Value Date    B12 580 10/02/2018     Lab Results   Component Value Date bradycarida without any ST or T wave changes QTc 433  Low muscle mass- 12.2%--> strength training exercises encouraged     PLAN   Continue with exercising- try to increase  Continue with MFP, make appt with cami (didn't make this past month)  Keep up the g eggs  4.  Healthy snacks: peanut butter and apples, hummus and carrots, berries, nuts (1/4 cup), tuna and crackers                 Protein Shakes: Premier protein or Core Power                Protein Bars: Rx Swapna Sánchez, [employer]

## 2019-04-03 NOTE — PATIENT INSTRUCTIONS
Keep up the great work!!  #32 lbs of weight loss! !      PLAN:  Continue with medications: ozempic 1mg, and phentermine 37.5mg   Follow up with me in 1 month  Schedule follow up appointments:  Dietitian/nutritionist      Please try to work on the following mindfulness, meditation, clarity, sleep, and clarence to your daily life.

## 2019-04-24 ENCOUNTER — OFFICE VISIT (OUTPATIENT)
Dept: FAMILY MEDICINE CLINIC | Facility: CLINIC | Age: 44
End: 2019-04-24
Payer: COMMERCIAL

## 2019-04-24 VITALS
SYSTOLIC BLOOD PRESSURE: 124 MMHG | BODY MASS INDEX: 42.69 KG/M2 | HEIGHT: 62 IN | RESPIRATION RATE: 16 BRPM | WEIGHT: 232 LBS | OXYGEN SATURATION: 98 % | DIASTOLIC BLOOD PRESSURE: 78 MMHG | TEMPERATURE: 98 F | HEART RATE: 102 BPM

## 2019-04-24 DIAGNOSIS — J03.90 TONSILLITIS: Primary | ICD-10-CM

## 2019-04-24 DIAGNOSIS — J02.9 SORE THROAT: ICD-10-CM

## 2019-04-24 PROCEDURE — 99213 OFFICE O/P EST LOW 20 MIN: CPT | Performed by: NURSE PRACTITIONER

## 2019-04-24 PROCEDURE — 87081 CULTURE SCREEN ONLY: CPT | Performed by: NURSE PRACTITIONER

## 2019-04-24 PROCEDURE — 87880 STREP A ASSAY W/OPTIC: CPT | Performed by: NURSE PRACTITIONER

## 2019-04-24 RX ORDER — PREDNISONE 20 MG/1
40 TABLET ORAL DAILY
Qty: 10 TABLET | Refills: 0 | Status: SHIPPED | OUTPATIENT
Start: 2019-04-24 | End: 2019-04-29

## 2019-04-25 NOTE — PATIENT INSTRUCTIONS
Tonsillitis (Child)    Tonsillitis is an inflammation or infection of your child's tonsils. Your child has 2 tonsils, 1 on either side of his or her throat. The tonsils are large, oval glands. They help prevent infections.  But tonsils can become infected Unless advised otherwise, call your child's healthcare provider if:  · Your child is 1 months old or younger and has a fever of 100.4°F (38°C) or higher. Your child may need to see a healthcare provider.   · Your child is of any age and has fevers higher th Over-the-counter medicine can reduce sore throat symptoms. Ask your pharmacist if you have questions about which medicine to use:  · Ease pain with anesthetic sprays. Aspirin or an aspirin substitute also helps.  Remember, never give aspirin to anyone 18 or

## 2019-04-25 NOTE — PROGRESS NOTES
CHIEF COMPLAINT:   Patient presents with:  Sore Throat: H/A (migraine), white spots on throat         HPI:   Vane Nugent is a 38year old female presents to clinic with complaint of sore throat. Patient has had for 1 days.  Symptoms have been wors GENERAL: well developed, well nourished,in no apparent distress  SKIN: no rashes,no suspicious lesions  HEAD: atraumatic, normocephalic  EYES: conjunctiva clear, EOM intact  EARS: TM's clear, non-injected, no bulging, retraction, or fluid bilaterally  NOSE Tonsillitis can be caused by bacteria or a virus. The main symptom is a sore throat. Your child may also have a fever, throat redness or swelling, or trouble swallowing. The tonsils may also look white, gray, or yellow.   If your child has a bacterial infec · Child has a sore throat for more than 2 days  · Child has a sore throat with fever, headache, stomachache, or rash  · Child has neck pain  · Child has a seizure  · Child is acting strangely  · Child has trouble swallowing or breathing  · Child can’t open · Remember: unless a sore throat is caused by a bacterial infection, antibiotics won’t help you. Prevent future sore throats  Prevention tips include the following:  · Stop smoking or reduce contact with secondhand smoke.  Smoke irritates the tender throat

## 2019-05-14 ENCOUNTER — OFFICE VISIT (OUTPATIENT)
Dept: INTERNAL MEDICINE CLINIC | Facility: CLINIC | Age: 44
End: 2019-05-14
Payer: COMMERCIAL

## 2019-05-14 VITALS
DIASTOLIC BLOOD PRESSURE: 80 MMHG | RESPIRATION RATE: 16 BRPM | BODY MASS INDEX: 42.51 KG/M2 | SYSTOLIC BLOOD PRESSURE: 130 MMHG | HEART RATE: 76 BPM | HEIGHT: 62 IN | WEIGHT: 231 LBS

## 2019-05-14 DIAGNOSIS — E78.5 DYSLIPIDEMIA: ICD-10-CM

## 2019-05-14 DIAGNOSIS — E03.9 ACQUIRED HYPOTHYROIDISM: ICD-10-CM

## 2019-05-14 DIAGNOSIS — E66.01 OBESITY, MORBID, BMI 50 OR HIGHER (HCC): ICD-10-CM

## 2019-05-14 DIAGNOSIS — E11.10 TYPE 2 DIABETES MELLITUS WITH KETOACIDOSIS WITHOUT COMA, WITHOUT LONG-TERM CURRENT USE OF INSULIN (HCC): ICD-10-CM

## 2019-05-14 DIAGNOSIS — Z51.81 THERAPEUTIC DRUG MONITORING: Primary | ICD-10-CM

## 2019-05-14 PROCEDURE — 99214 OFFICE O/P EST MOD 30 MIN: CPT | Performed by: NURSE PRACTITIONER

## 2019-05-14 RX ORDER — PHENTERMINE HYDROCHLORIDE 37.5 MG/1
37.5 TABLET ORAL
Qty: 30 TABLET | Refills: 0 | Status: SHIPPED | OUTPATIENT
Start: 2019-05-14 | End: 2019-09-14

## 2019-05-14 NOTE — PROGRESS NOTES
HISTORY OF PRESENT ILLNESS  Patient presents with:  Weight Check: lost 5 pounds    Yajaira Kelly is a 38year old female here for follow up with medical weight loss program for the treatment of overweight, obesity, or morbid obesity.  Patient has lost conditions:negative  Migraines/seizures: +headaches  Glaucoma: negative   Depression/anxiety: negative  Constipation: negative  DVT: negative  Family or personal history of Pancreatic issues / Medullary Thyroid Cancer: negative  History of bariatric surger Date    T4F 1.19 02/27/2019    TSH 2.557 02/27/2019     Lab Results   Component Value Date    B12 580 10/02/2018     Lab Results   Component Value Date    VITD 22.3 (L) 10/02/2018         Current Outpatient Medications on File Prior to Visit:  OZEMPIC 1 MG physical exercise (tashia)   Written food log discussed    Medication:  · Medication use and side effects reviewed with patient.    · Will continue with phentermine 37.5mg daily  · Will continue with metformin 500mg BID (can't tolerate higher b/c of diarrhea chocolate     5. Reduce carbohydrates which includes sweets as well as rice, pasta, potatoes, bread, corn and instead choose whole grain options or more protein or vegetables (4-6 servings of vegetables per day)  6. Get a good night of sleep  7.  Try to dec

## 2019-05-14 NOTE — PATIENT INSTRUCTIONS
Keep up the great work! !     PLAN:  Continue with medications: phentermine, metformin, and ozempic 1mg  Pickup miralax- for constipation   Follow up with me in 1 month  Schedule follow up appointments:  Dietitian/nutritionist      Please try to work on the mindfulness, meditation, clarity, sleep, and clarence to your daily life.

## 2019-05-31 ENCOUNTER — EMPLOYEE HEALTH (OUTPATIENT)
Dept: OTHER | Facility: HOSPITAL | Age: 44
End: 2019-05-31
Attending: FAMILY MEDICINE

## 2019-05-31 DIAGNOSIS — Z01.84 IMMUNITY STATUS TESTING: Primary | ICD-10-CM

## 2019-07-22 ENCOUNTER — TELEPHONE (OUTPATIENT)
Dept: FAMILY MEDICINE CLINIC | Facility: CLINIC | Age: 44
End: 2019-07-22

## 2019-07-22 DIAGNOSIS — B96.89 BACTERIAL VAGINITIS: ICD-10-CM

## 2019-07-22 DIAGNOSIS — N76.0 BACTERIAL VAGINITIS: ICD-10-CM

## 2019-07-22 RX ORDER — METRONIDAZOLE 500 MG/1
500 TABLET ORAL 2 TIMES DAILY
Qty: 14 TABLET | Refills: 0 | Status: SHIPPED | OUTPATIENT
Start: 2019-07-22 | End: 2019-07-29

## 2019-07-22 NOTE — TELEPHONE ENCOUNTER
Please see below message from patient. OK to send script for patient or does she need OV? This was last prescribed on 12/4/18.

## 2019-07-22 NOTE — TELEPHONE ENCOUNTER
Patient is having the same symptoms last time she was in the office with bacterial vaginosis, patient just started a new job and she works from 7:30-5:00 every day.   Patient wants to know if you can prescribed the following medication for her at the [facility]

## 2019-07-23 NOTE — TELEPHONE ENCOUNTER
I spoke to the patient and let her know the below message. Patient did not have any further questions. - - -

## 2019-08-08 ENCOUNTER — MED REC SCAN ONLY (OUTPATIENT)
Dept: FAMILY MEDICINE CLINIC | Facility: CLINIC | Age: 44
End: 2019-08-08

## 2019-08-27 ENCOUNTER — TELEPHONE (OUTPATIENT)
Dept: INTERNAL MEDICINE CLINIC | Facility: CLINIC | Age: 44
End: 2019-08-27

## 2019-08-27 ENCOUNTER — PATIENT MESSAGE (OUTPATIENT)
Dept: INTERNAL MEDICINE CLINIC | Facility: CLINIC | Age: 44
End: 2019-08-27

## 2019-08-27 NOTE — TELEPHONE ENCOUNTER
Patient was last seen in May and was to return in June - no appts cancelled or made previously.   Tried to call and schedule - phone changes to busy signal.  My chart sent

## 2019-08-28 NOTE — TELEPHONE ENCOUNTER
Well, I am already doubled booked 2 times for this next sat sept. 14 but I am trying to extend it for 1 more hour. ..  So let me talk with isaak tomorrow and see if we can get her in at that time (hold on calling her back)

## 2019-08-28 NOTE — TELEPHONE ENCOUNTER
Pt requested refills of phentermine, metformin and ozempic. I sent her a my chart that she has not been seen since May and thus needs an appointment. She wrote back the following reply.   You dont work late enough for her so she will likely need Saturdays

## 2019-08-28 NOTE — TELEPHONE ENCOUNTER
From: Yan Campos  To: ROSANGELA Doherty  Sent: 8/27/2019 9:34 PM CDT  Subject: Referral Request    Beka Meza, what time do you have available?  I'm sorry I haven't followed up, but I started a new job in June, and I'm still on my 80 day probati

## 2019-08-29 NOTE — TELEPHONE ENCOUNTER
Rosanne Rene added hours to her Saturday schedule on 9/14/19. She will see patient at 12:20.   Patient will come    Future Appointments   Date Time Provider Olivia Lemons   9/14/2019 12:20 PM ROSANGELA Mustafa [facility] [address]

## 2019-09-06 ENCOUNTER — APPOINTMENT (OUTPATIENT)
Dept: GENERAL RADIOLOGY | Age: 44
End: 2019-09-06
Attending: FAMILY MEDICINE
Payer: COMMERCIAL

## 2019-09-06 ENCOUNTER — HOSPITAL ENCOUNTER (OUTPATIENT)
Age: 44
Discharge: HOME | End: 2019-09-06
Attending: FAMILY MEDICINE
Payer: COMMERCIAL

## 2019-09-06 VITALS
HEIGHT: 62 IN | DIASTOLIC BLOOD PRESSURE: 73 MMHG | BODY MASS INDEX: 42.33 KG/M2 | OXYGEN SATURATION: 100 % | HEART RATE: 64 BPM | RESPIRATION RATE: 20 BRPM | SYSTOLIC BLOOD PRESSURE: 133 MMHG | WEIGHT: 230 LBS | TEMPERATURE: 98 F

## 2019-09-06 DIAGNOSIS — M75.31 CALCIFIC TENDINITIS OF RIGHT SHOULDER: ICD-10-CM

## 2019-09-06 DIAGNOSIS — S43.51XA SPRAIN OF ACROMIOCLAVICULAR JOINT, RIGHT, INITIAL ENCOUNTER: Primary | ICD-10-CM

## 2019-09-06 PROCEDURE — 99203 OFFICE O/P NEW LOW 30 MIN: CPT

## 2019-09-06 PROCEDURE — 73030 X-RAY EXAM OF SHOULDER: CPT | Performed by: FAMILY MEDICINE

## 2019-09-06 PROCEDURE — 99213 OFFICE O/P EST LOW 20 MIN: CPT

## 2019-09-06 RX ORDER — METHYLPREDNISOLONE 4 MG/1
TABLET ORAL
Qty: 1 PACKAGE | Refills: 0 | Status: SHIPPED | OUTPATIENT
Start: 2019-09-06 | End: 2019-09-14

## 2019-09-06 NOTE — ED INITIAL ASSESSMENT (HPI)
Pt states on Monday night she was taking videos on her phone using her right arm and then hugged her son. Pt states noticed pain to shoulder right after.   Pain with raising arm

## 2019-09-06 NOTE — ED PROVIDER NOTES
Patient Seen in: THE [facility] [place]    History   Patient presents with:  Upper Extremity Injury (musculoskeletal)    Stated Complaint: shoulder pain    HPI    19-year-old female with history of diabetes and thyroid disease presents IC second 133/73   Pulse 64   Temp 97.8 °F (36.6 °C) (Temporal)   Resp 20   Ht 157.5 cm (5' 2\")   Wt 104.3 kg   SpO2 100%   BMI 42.07 kg/m²         Physical Exam    General: Pleasant female in no distress  Neck: No acute deformity.   No tenderness on palpation of th about monitoring her glucose while on the steroids. Symptoms do not resolve or improve, follow-up with PCP for further evaluation and care.                 Disposition and Plan     Clinical Impression:  Sprain of acromioclavicular joint, right, initial enc

## 2019-09-14 ENCOUNTER — TELEPHONE (OUTPATIENT)
Dept: INTERNAL MEDICINE CLINIC | Facility: CLINIC | Age: 44
End: 2019-09-14

## 2019-09-14 ENCOUNTER — OFFICE VISIT (OUTPATIENT)
Dept: INTERNAL MEDICINE CLINIC | Facility: CLINIC | Age: 44
End: 2019-09-14
Payer: COMMERCIAL

## 2019-09-14 VITALS
HEART RATE: 70 BPM | WEIGHT: 229 LBS | HEIGHT: 62 IN | SYSTOLIC BLOOD PRESSURE: 122 MMHG | RESPIRATION RATE: 14 BRPM | BODY MASS INDEX: 42.14 KG/M2 | DIASTOLIC BLOOD PRESSURE: 78 MMHG

## 2019-09-14 DIAGNOSIS — E66.01 OBESITY, MORBID, BMI 50 OR HIGHER (HCC): ICD-10-CM

## 2019-09-14 DIAGNOSIS — E78.5 DYSLIPIDEMIA: ICD-10-CM

## 2019-09-14 DIAGNOSIS — Z51.81 THERAPEUTIC DRUG MONITORING: Primary | ICD-10-CM

## 2019-09-14 DIAGNOSIS — E11.9 CONTROLLED TYPE 2 DIABETES MELLITUS WITHOUT COMPLICATION, WITHOUT LONG-TERM CURRENT USE OF INSULIN (HCC): ICD-10-CM

## 2019-09-14 DIAGNOSIS — E06.3 HASHIMOTO'S THYROIDITIS: ICD-10-CM

## 2019-09-14 DIAGNOSIS — E03.9 ACQUIRED HYPOTHYROIDISM: ICD-10-CM

## 2019-09-14 PROCEDURE — 99214 OFFICE O/P EST MOD 30 MIN: CPT | Performed by: NURSE PRACTITIONER

## 2019-09-14 RX ORDER — SEMAGLUTIDE 1.34 MG/ML
1 INJECTION, SOLUTION SUBCUTANEOUS WEEKLY
Qty: 6 PEN | Refills: 0 | Status: SHIPPED | OUTPATIENT
Start: 2019-09-14 | End: 2019-12-31

## 2019-09-14 RX ORDER — PHENTERMINE HYDROCHLORIDE 37.5 MG/1
37.5 TABLET ORAL
Qty: 30 TABLET | Refills: 1 | Status: SHIPPED | OUTPATIENT
Start: 2019-09-14 | End: 2019-10-28

## 2019-09-14 NOTE — PATIENT INSTRUCTIONS
Keep up the great work! !     PLAN:  Continue with medications: phentermine 37.5mg, metformin and ozempic  Dr. Chanell Talbert- intermittent fasting   Follow up with me in 1 month  Schedule follow up appointments:  Dietitian/nutritionist      Please try to work on the mindfulness, meditation, clarity, sleep, and clarence to your daily life.

## 2019-09-14 NOTE — PROGRESS NOTES
HISTORY OF PRESENT ILLNESS  Patient presents with:  Weight Check: down 2     Haven Stewart is a 38year old female here for follow up with medical weight loss program for the treatment of overweight, obesity, or morbid obesity.  Patient has lost -# 2 stones: negative  Liver disease: negative  Joint-related conditions:negative  Migraines/seizures: +headaches  Glaucoma: negative   Depression/anxiety: negative  Constipation: negative  DVT: negative  Family or personal history of Pancreatic issues / Luiza Ball Hilario 130 (H) 07/18/2018     Lab Results   Component Value Date    T4F 1.19 02/27/2019    TSH 2.557 02/27/2019     Lab Results   Component Value Date    B12 580 10/02/2018     Lab Results   Component Value Date    VITD 22.3 (L) 10/02/2018         Current success.  Discussed first goal of weight loss 5% in 3 months and 10% in 6 months    Abnormal labs: a1c 6.1% on 1/2019  EKG in office completed- Normal axis, sinus bradycarida without any ST or T wave changes QTc 433  Low muscle mass- 12.2%--> strength train

## 2019-09-24 ENCOUNTER — OFFICE VISIT (OUTPATIENT)
Dept: UROLOGY | Facility: HOSPITAL | Age: 44
End: 2019-09-24
Attending: OBSTETRICS & GYNECOLOGY
Payer: COMMERCIAL

## 2019-09-24 VITALS
HEIGHT: 62 IN | BODY MASS INDEX: 42.14 KG/M2 | SYSTOLIC BLOOD PRESSURE: 114 MMHG | DIASTOLIC BLOOD PRESSURE: 76 MMHG | WEIGHT: 229 LBS

## 2019-09-24 DIAGNOSIS — R31.0 GROSS HEMATURIA: Primary | ICD-10-CM

## 2019-09-24 LAB
BILIRUB UR QL STRIP.AUTO: NEGATIVE
CLARITY UR REFRACT.AUTO: CLEAR
COLOR UR AUTO: YELLOW
CONTROL RUN WITHIN 24 HOURS?: YES
GLUCOSE UR STRIP.AUTO-MCNC: NEGATIVE MG/DL
KETONES UR STRIP.AUTO-MCNC: NEGATIVE MG/DL
LEUKOCYTE ESTERASE UR QL STRIP.AUTO: NEGATIVE
LEUKOCYTE ESTERASE URINE: NEGATIVE
NITRITE UR QL STRIP.AUTO: NEGATIVE
NITRITE URINE: NEGATIVE
PH UR STRIP.AUTO: 6 [PH] (ref 4.5–8)
PROT UR STRIP.AUTO-MCNC: NEGATIVE MG/DL
RBC UR QL AUTO: NEGATIVE
SP GR UR STRIP.AUTO: 1.01 (ref 1–1.03)
UROBILINOGEN UR STRIP.AUTO-MCNC: <2 MG/DL

## 2019-09-24 PROCEDURE — 88108 CYTOPATH CONCENTRATE TECH: CPT | Performed by: OBSTETRICS & GYNECOLOGY

## 2019-09-24 PROCEDURE — 87086 URINE CULTURE/COLONY COUNT: CPT | Performed by: OBSTETRICS & GYNECOLOGY

## 2019-09-24 PROCEDURE — 81002 URINALYSIS NONAUTO W/O SCOPE: CPT

## 2019-09-24 PROCEDURE — 52000 CYSTOURETHROSCOPY: CPT

## 2019-09-24 RX ORDER — LIDOCAINE HYDROCHLORIDE 20 MG/ML
10 JELLY TOPICAL ONCE
Status: COMPLETED | OUTPATIENT
Start: 2019-09-24 | End: 2019-09-24

## 2019-09-24 RX ADMIN — LIDOCAINE HYDROCHLORIDE 10 ML: 20 JELLY TOPICAL at 12:06:00

## 2019-09-24 NOTE — PROGRESS NOTES
[facility] [place] at  [address] [facility] [address] [place] [id number]  [facility]: [phone]  FAX: [phone]     Date Patient MRN   9/24/2019 Slade Valencia More gel .  The urethra was gently dilated to accommodate the Cystoscope. Infusion of sterile water via the cystoscope was performed to achieve a comfortable bladder distension to allow a full view.   A careful, systemic assessment of the complete lumen of the

## 2019-09-24 NOTE — PATIENT INSTRUCTIONS
[address] [facility]  [address] [facility] [address]  [place] [address]  [facility]: [phone]  FAX: [phone]       Cystoscopy Discharge Instructions    You may have some mild discomfort today from your cystoscopy.   There may

## 2019-09-27 LAB — NON GYNE INTERPRETATION: NEGATIVE

## 2019-10-01 ENCOUNTER — TELEPHONE (OUTPATIENT)
Dept: UROLOGY | Facility: HOSPITAL | Age: 44
End: 2019-10-01

## 2019-10-14 ENCOUNTER — APPOINTMENT (OUTPATIENT)
Dept: LAB | Age: 44
End: 2019-10-14
Attending: DERMATOLOGY
Payer: COMMERCIAL

## 2019-10-14 DIAGNOSIS — R52 TENDERNESS: ICD-10-CM

## 2019-10-14 DIAGNOSIS — L72.0 EPIDERMAL CYST: ICD-10-CM

## 2019-10-14 PROCEDURE — 88305 TISSUE EXAM BY PATHOLOGIST: CPT

## 2019-10-14 PROCEDURE — 88304 TISSUE EXAM BY PATHOLOGIST: CPT

## 2019-10-28 ENCOUNTER — OFFICE VISIT (OUTPATIENT)
Dept: INTERNAL MEDICINE CLINIC | Facility: CLINIC | Age: 44
End: 2019-10-28
Payer: COMMERCIAL

## 2019-10-28 VITALS
RESPIRATION RATE: 16 BRPM | BODY MASS INDEX: 42.14 KG/M2 | SYSTOLIC BLOOD PRESSURE: 122 MMHG | DIASTOLIC BLOOD PRESSURE: 76 MMHG | HEART RATE: 76 BPM | WEIGHT: 229 LBS | HEIGHT: 62 IN

## 2019-10-28 DIAGNOSIS — E03.9 ACQUIRED HYPOTHYROIDISM: ICD-10-CM

## 2019-10-28 DIAGNOSIS — E78.5 DYSLIPIDEMIA: ICD-10-CM

## 2019-10-28 DIAGNOSIS — Z51.81 THERAPEUTIC DRUG MONITORING: Primary | ICD-10-CM

## 2019-10-28 DIAGNOSIS — E66.01 OBESITY, MORBID, BMI 50 OR HIGHER (HCC): ICD-10-CM

## 2019-10-28 DIAGNOSIS — E11.9 CONTROLLED TYPE 2 DIABETES MELLITUS WITHOUT COMPLICATION, WITHOUT LONG-TERM CURRENT USE OF INSULIN (HCC): ICD-10-CM

## 2019-10-28 PROCEDURE — 99214 OFFICE O/P EST MOD 30 MIN: CPT | Performed by: NURSE PRACTITIONER

## 2019-10-28 RX ORDER — PHENTERMINE HYDROCHLORIDE 37.5 MG/1
37.5 TABLET ORAL
Qty: 30 TABLET | Refills: 0 | Status: SHIPPED | OUTPATIENT
Start: 2019-11-25 | End: 2019-12-31

## 2019-10-28 NOTE — PROGRESS NOTES
HISTORY OF PRESENT ILLNESS  Patient presents with:  Weight Check: no weight change    Ofelia Hull is a 38year old female here for follow up with medical weight loss program for the treatment of overweight, obesity, or morbid obesity.  Meena Diabetes: type II DM  Thyroid disease: hypothyroid   Renal disease: negative   Kidney stones: negative  Liver disease: negative  Joint-related conditions:negative  Migraines/seizures: +headaches  Glaucoma: negative   Depression/anxiety: negative  Constip HDL 50 07/18/2018     07/18/2018    VLDL 19 07/18/2018    TCHDLRATIO 3.60 07/18/2018    NONHDLC 130 (H) 07/18/2018     Lab Results   Component Value Date    T4F 1.19 02/27/2019    TSH 2.557 02/27/2019     Lab Results   Component Value Date    B12 58 exercises encouraged     PLAN   Continue with exercising- try to increase  Continue with MFP, make appt with cami (didn't make this past month)  Keep up the good work with decreasing carbs  Continue with physical exercise (tashia)   Needs to get back on tra berries, nuts (1/4 cup), tuna and crackers                 Protein Shakes: Premier protein or Core Power                Protein Bars: Rx Bars, [employer]at[employer], Power Crunch                 S[employer] balanced breaks (cheese and nuts)- without chocolate     5. Reduce

## 2019-10-28 NOTE — PATIENT INSTRUCTIONS
PLAN:  Continue with medications: phentermine 37.5mg, ozempic 1mg, metformin   intermittent fasting- dr. Chong Esposito  Follow up with me in 1 month  Schedule follow up appointments:  Dietitian/nutritionist      Please try to work on the following dietary changes: clarity, sleep, and clarence to your daily life.    w

## 2019-12-28 ENCOUNTER — OFFICE VISIT (OUTPATIENT)
Dept: FAMILY MEDICINE CLINIC | Facility: CLINIC | Age: 44
End: 2019-12-28
Payer: COMMERCIAL

## 2019-12-28 VITALS
DIASTOLIC BLOOD PRESSURE: 88 MMHG | OXYGEN SATURATION: 99 % | SYSTOLIC BLOOD PRESSURE: 112 MMHG | TEMPERATURE: 98 F | BODY MASS INDEX: 41.26 KG/M2 | WEIGHT: 224.19 LBS | HEIGHT: 62 IN | HEART RATE: 81 BPM

## 2019-12-28 DIAGNOSIS — J02.9 SORE THROAT: ICD-10-CM

## 2019-12-28 DIAGNOSIS — J03.80 ACUTE TONSILLITIS DUE TO OTHER SPECIFIED ORGANISMS: Primary | ICD-10-CM

## 2019-12-28 PROCEDURE — 87880 STREP A ASSAY W/OPTIC: CPT | Performed by: FAMILY MEDICINE

## 2019-12-28 PROCEDURE — 87081 CULTURE SCREEN ONLY: CPT | Performed by: FAMILY MEDICINE

## 2019-12-28 PROCEDURE — 99213 OFFICE O/P EST LOW 20 MIN: CPT | Performed by: FAMILY MEDICINE

## 2019-12-28 RX ORDER — CEFDINIR 300 MG/1
300 CAPSULE ORAL 2 TIMES DAILY
Qty: 20 CAPSULE | Refills: 0 | Status: SHIPPED | OUTPATIENT
Start: 2019-12-28 | End: 2020-02-10

## 2019-12-28 RX ORDER — METHYLPREDNISOLONE 4 MG/1
TABLET ORAL
Qty: 1 KIT | Refills: 0 | Status: SHIPPED | OUTPATIENT
Start: 2019-12-28 | End: 2020-02-10

## 2019-12-28 NOTE — PROGRESS NOTES
CHIEF COMPLAINT:   Patient presents with:  Sore Throat: X 1 day, pain with swallowing. hx strep, no fever      HPI:   Heidi Sexton is a 38year old female who presents for upper respiratory symptoms for  1 days.  Patient reports sore throat, 99%   BMI 41.01 kg/m²   GENERAL: well developed, well nourished,in no apparent distress  SKIN: no rashes,no suspicious lesions  HEAD: atraumatic, normocephalic.  no tenderness on palpation of maxillary or frontal sinuses  EYES: conjunctiva clear, EOM intac

## 2019-12-30 ENCOUNTER — OFFICE VISIT (OUTPATIENT)
Dept: INTERNAL MEDICINE CLINIC | Facility: CLINIC | Age: 44
End: 2019-12-30
Payer: COMMERCIAL

## 2019-12-30 VITALS
RESPIRATION RATE: 16 BRPM | SYSTOLIC BLOOD PRESSURE: 122 MMHG | WEIGHT: 223 LBS | HEART RATE: 80 BPM | HEIGHT: 62 IN | DIASTOLIC BLOOD PRESSURE: 80 MMHG | BODY MASS INDEX: 41.04 KG/M2

## 2019-12-30 DIAGNOSIS — E66.01 OBESITY, MORBID, BMI 50 OR HIGHER (HCC): ICD-10-CM

## 2019-12-30 DIAGNOSIS — E03.9 ACQUIRED HYPOTHYROIDISM: ICD-10-CM

## 2019-12-30 DIAGNOSIS — E78.5 DYSLIPIDEMIA: ICD-10-CM

## 2019-12-30 DIAGNOSIS — Z51.81 THERAPEUTIC DRUG MONITORING: Primary | ICD-10-CM

## 2019-12-30 DIAGNOSIS — E11.9 CONTROLLED TYPE 2 DIABETES MELLITUS WITHOUT COMPLICATION, WITHOUT LONG-TERM CURRENT USE OF INSULIN (HCC): ICD-10-CM

## 2019-12-30 PROCEDURE — 99214 OFFICE O/P EST MOD 30 MIN: CPT | Performed by: NURSE PRACTITIONER

## 2019-12-30 RX ORDER — PHENTERMINE HYDROCHLORIDE 37.5 MG/1
37.5 TABLET ORAL
Qty: 30 TABLET | Refills: 0 | Status: CANCELLED | OUTPATIENT
Start: 2019-12-30

## 2019-12-30 RX ORDER — SEMAGLUTIDE 1.34 MG/ML
1 INJECTION, SOLUTION SUBCUTANEOUS WEEKLY
Qty: 6 PEN | Refills: 0 | Status: CANCELLED | OUTPATIENT
Start: 2019-12-30

## 2019-12-30 NOTE — PATIENT INSTRUCTIONS
Keep up the great work! !     PLAN:  Continue with medications: ozempic, phentermine and metformin  Follow up with me in 1 month  Schedule follow up appointments:  Dietitian/nutritionist      Please try to work on the following dietary changes:  1.  Drink lo and clarence to your daily life.

## 2019-12-30 NOTE — PROGRESS NOTES
HISTORY OF PRESENT ILLNESS  Patient presents with:  Weight Check: lost 6 pounds    Andrea Cardona is a 38year old female here for follow up with medical weight loss program for the treatment of overweight, obesity, or morbid obesity.  Patient reviewed:   Cardiac disorders:negative   Diabetes: type II DM  Thyroid disease: hypothyroid   Renal disease: negative   Kidney stones: negative  Liver disease: negative  Joint-related conditions:negative  Migraines/seizures: +headaches  Glaucoma: negative 07/18/2018    TRIG 94 07/18/2018    HDL 50 07/18/2018     07/18/2018    VLDL 19 07/18/2018    TCHDLRATIO 3.60 07/18/2018    NONHDLC 130 (H) 07/18/2018     Lab Results   Component Value Date    T4F 1.19 02/27/2019    TSH 2.557 02/27/2019     Lab Resu first goal of weight loss 5% in 3 months and 10% in 6 months    Abnormal labs: a1c 6.1% on 1/2019  EKG in office completed- Normal axis, sinus bradycarida without any ST or T wave changes QTc 433  Low muscle mass- 12.2%--> strength training exercises encou hour)  3. Focus on protein: (15-30 grams with each meal) ie. greek yogurt, cottage cheese, string cheese, hard boiled eggs  4.  Healthy snacks: peanut butter and apples, hummus and carrots, berries, nuts (1/4 cup), tuna and crackers                 Protein

## 2019-12-31 RX ORDER — SEMAGLUTIDE 1.34 MG/ML
1 INJECTION, SOLUTION SUBCUTANEOUS WEEKLY
Qty: 6 PEN | Refills: 0 | Status: SHIPPED | OUTPATIENT
Start: 2019-12-31 | End: 2020-02-10

## 2019-12-31 RX ORDER — PHENTERMINE HYDROCHLORIDE 37.5 MG/1
37.5 TABLET ORAL
Qty: 30 TABLET | Refills: 0 | Status: SHIPPED | OUTPATIENT
Start: 2019-12-31 | End: 2020-02-10

## 2020-01-08 ENCOUNTER — OFFICE VISIT (OUTPATIENT)
Dept: FAMILY MEDICINE CLINIC | Facility: CLINIC | Age: 45
End: 2020-01-08
Payer: COMMERCIAL

## 2020-01-08 VITALS
RESPIRATION RATE: 16 BRPM | HEIGHT: 62 IN | DIASTOLIC BLOOD PRESSURE: 68 MMHG | OXYGEN SATURATION: 99 % | TEMPERATURE: 98 F | WEIGHT: 226 LBS | BODY MASS INDEX: 41.59 KG/M2 | HEART RATE: 77 BPM | SYSTOLIC BLOOD PRESSURE: 130 MMHG

## 2020-01-08 DIAGNOSIS — S16.1XXA NECK STRAIN, INITIAL ENCOUNTER: Primary | ICD-10-CM

## 2020-01-08 PROCEDURE — 99213 OFFICE O/P EST LOW 20 MIN: CPT | Performed by: NURSE PRACTITIONER

## 2020-01-08 RX ORDER — NAPROXEN SODIUM 550 MG/1
550 TABLET ORAL 2 TIMES DAILY WITH MEALS
Qty: 28 TABLET | Refills: 0 | Status: SHIPPED | OUTPATIENT
Start: 2020-01-08 | End: 2020-01-22

## 2020-01-08 RX ORDER — CYCLOBENZAPRINE HCL 5 MG
5 TABLET ORAL 3 TIMES DAILY PRN
Qty: 15 TABLET | Refills: 0 | Status: SHIPPED | OUTPATIENT
Start: 2020-01-08 | End: 2020-01-13

## 2020-01-08 RX ORDER — PREDNISONE 20 MG/1
20 TABLET ORAL 2 TIMES DAILY
Qty: 10 TABLET | Refills: 0 | Status: SHIPPED | OUTPATIENT
Start: 2020-01-08 | End: 2020-01-11

## 2020-01-08 NOTE — PATIENT INSTRUCTIONS
Rest  Heat or ice  Follow up for worsening symptoms or no improvement    Understanding Cervical Strain    There are 7 bones (vertebrae) in the neck that are part of the spine. These are called the cervical spine.  Cervical strain is a medical term for [facility] · Numbness, tingling, weakness or shooting pains into the arms or legs  · New symptoms  Date Last Reviewed: 3/10/2016  © 5278-2141 [employer]. [address] [facility], [address] [place] [address]. All rights reserved.  This information is not intended a · Non-steroidal anti-inflammatory medicines, such as ibuprofen, may reduce pain and swelling, as well. Ask your healthcare provider for advice.   When to call your healthcare provider  Call your healthcare provider if:  · The injured joint won’t move, or paula

## 2020-01-09 NOTE — PROGRESS NOTES
Patient presents with:  Pain: neck is stiff ( 1 day)     HPI:     Gabi Victoria is a 38year old female who presents with a chief complaint of neck pain. Treated for tonsillitis last week, finished antibiotics. Denies fever or sore throat. [facility])    • Thyroid disease      Past Surgical History:   Procedure Laterality Date   •      • CHOLECYSTECTOMY     • HERNIA SURGERY     • HYSTERECTOMY  2016   • TUBAL LIGATION       ROS:   GENERAL HEALTH: feels well otherwise  SKIN: denies any unus Good hand   -  Radial pulses intact   - No cervical lymphadenopathy       Assessment/Plan:     Diagnosis:    ICD-10-CM    1. Neck strain, initial encounter S16. 1XXA cyclobenzaprine 5 MG Oral Tab     Naproxen Sodium 550 MG Oral Tab     predniSONE 20 MG 3 days. Dispense:  10 tablet          Refill:  0      Labs performed this visit:  No results found for this or any previous visit (from the past 10 hour(s)).     Plan:  C-spine X-rays --> N/A  In clinic treatment today: none  Discharge medications:

## 2020-02-07 ENCOUNTER — TELEPHONE (OUTPATIENT)
Dept: UROLOGY | Facility: HOSPITAL | Age: 45
End: 2020-02-07

## 2020-02-07 DIAGNOSIS — N39.3 FEMALE STRESS INCONTINENCE: Primary | ICD-10-CM

## 2020-02-07 DIAGNOSIS — N39.41 URGE INCONTINENCE: ICD-10-CM

## 2020-02-10 ENCOUNTER — OFFICE VISIT (OUTPATIENT)
Dept: INTERNAL MEDICINE CLINIC | Facility: CLINIC | Age: 45
End: 2020-02-10
Payer: COMMERCIAL

## 2020-02-10 VITALS
BODY MASS INDEX: 40.85 KG/M2 | HEIGHT: 62 IN | HEART RATE: 70 BPM | WEIGHT: 222 LBS | DIASTOLIC BLOOD PRESSURE: 70 MMHG | RESPIRATION RATE: 16 BRPM | SYSTOLIC BLOOD PRESSURE: 120 MMHG

## 2020-02-10 DIAGNOSIS — Z51.81 THERAPEUTIC DRUG MONITORING: Primary | ICD-10-CM

## 2020-02-10 DIAGNOSIS — E03.9 ACQUIRED HYPOTHYROIDISM: ICD-10-CM

## 2020-02-10 DIAGNOSIS — E11.9 CONTROLLED TYPE 2 DIABETES MELLITUS WITHOUT COMPLICATION, WITHOUT LONG-TERM CURRENT USE OF INSULIN (HCC): ICD-10-CM

## 2020-02-10 DIAGNOSIS — E66.01 OBESITY, MORBID, BMI 50 OR HIGHER (HCC): ICD-10-CM

## 2020-02-10 DIAGNOSIS — E78.5 DYSLIPIDEMIA: ICD-10-CM

## 2020-02-10 PROCEDURE — 99214 OFFICE O/P EST MOD 30 MIN: CPT | Performed by: NURSE PRACTITIONER

## 2020-02-10 RX ORDER — PHENTERMINE HYDROCHLORIDE 37.5 MG/1
37.5 TABLET ORAL
Qty: 30 TABLET | Refills: 1 | Status: SHIPPED | OUTPATIENT
Start: 2020-02-10 | End: 2020-04-27

## 2020-02-10 RX ORDER — SEMAGLUTIDE 1.34 MG/ML
1 INJECTION, SOLUTION SUBCUTANEOUS WEEKLY
Qty: 6 PEN | Refills: 0 | Status: SHIPPED | OUTPATIENT
Start: 2020-02-10 | End: 2020-04-27

## 2020-02-10 NOTE — PROGRESS NOTES
HISTORY OF PRESENT ILLNESS  Patient presents with:  Weight Check: losst 1 pound    Shani Humphrey is a 38year old female here for follow up with medical weight loss program for the treatment of overweight, obesity, or morbid obesity.  Patient Renal disease: negative   Kidney stones: negative  Liver disease: negative  Joint-related conditions:negative  Migraines/seizures: +headaches  Glaucoma: negative   Depression/anxiety: negative  Constipation: negative  DVT: negative  Family or personal hi 07/18/2018    TCHDLRATIO 3.60 07/18/2018    Hilario 130 (H) 07/18/2018     Lab Results   Component Value Date    T4F 1.19 02/27/2019    TSH 2.557 02/27/2019     Lab Results   Component Value Date    B12 580 10/02/2018     Lab Results   Component Value Date months and 10% in 6 months    Abnormal labs: a1c 6.1% on 1/2019  EKG in office completed- Normal axis, sinus bradycarida without any ST or T wave changes QTc 433  Low muscle mass- 12.2%--> strength training exercises encouraged     PLAN   Continue with exe

## 2020-02-10 NOTE — PATIENT INSTRUCTIONS
Keep up the great work!!  #46 lbs of weight loss so far!     PLAN:  Continue with medications: phentermine 37.5mg, metformin, ozempic 1mg  Follow up with me in 1 month  Schedule follow up appointments:  Dietitian/nutritionist      Please try to work on the mindfulness, meditation, clarity, sleep, and clarence to your daily life.

## 2020-03-03 ENCOUNTER — OFFICE VISIT (OUTPATIENT)
Dept: PHYSICAL THERAPY | Age: 45
End: 2020-03-03
Attending: OBSTETRICS & GYNECOLOGY
Payer: COMMERCIAL

## 2020-03-03 DIAGNOSIS — N39.41 URGE INCONTINENCE: ICD-10-CM

## 2020-03-03 DIAGNOSIS — N39.3 FEMALE STRESS INCONTINENCE: ICD-10-CM

## 2020-03-03 PROCEDURE — 97161 PT EVAL LOW COMPLEX 20 MIN: CPT

## 2020-03-03 PROCEDURE — 97110 THERAPEUTIC EXERCISES: CPT

## 2020-03-03 NOTE — PROGRESS NOTES
MUSCULOSKELETAL AND PELVIC FLOOR EVALUATION:   Referring Physician: Dr. Link Choudhary  Diagnosis: Female stress incontinence (N39.3) Urge incontinence (N39.41)     Date of Service: 3/3/2020     PATIENT SUMMARY   Alonso Jordan is a 54year old with primary c/o DARREN and urge UI. The results of the objective tests and measures show weakness in anterior pelvic floor muscles. Functional deficits include but are not limited to leaking with cough, sneeze, urge and intercourse.   Signs and symptoms are ER    Charges: PT Eval Low Complexity, Ex       Total Timed Treatment: 45 min     Total Treatment Time: 45 min     PLAN OF CARE:    Goals: (to be met in 8 visits)    Patient will demonstrate pelvic floor contraction average 10.0 mvc in 10 sec and 10 reps t

## 2020-03-19 ENCOUNTER — APPOINTMENT (OUTPATIENT)
Dept: PHYSICAL THERAPY | Age: 45
End: 2020-03-19
Attending: OBSTETRICS & GYNECOLOGY
Payer: COMMERCIAL

## 2020-03-26 ENCOUNTER — APPOINTMENT (OUTPATIENT)
Dept: PHYSICAL THERAPY | Age: 45
End: 2020-03-26
Attending: OBSTETRICS & GYNECOLOGY
Payer: COMMERCIAL

## 2020-04-02 ENCOUNTER — APPOINTMENT (OUTPATIENT)
Dept: PHYSICAL THERAPY | Age: 45
End: 2020-04-02
Attending: OBSTETRICS & GYNECOLOGY
Payer: COMMERCIAL

## 2020-04-09 ENCOUNTER — APPOINTMENT (OUTPATIENT)
Dept: PHYSICAL THERAPY | Age: 45
End: 2020-04-09
Attending: OBSTETRICS & GYNECOLOGY
Payer: COMMERCIAL

## 2020-04-16 ENCOUNTER — APPOINTMENT (OUTPATIENT)
Dept: PHYSICAL THERAPY | Age: 45
End: 2020-04-16
Attending: OBSTETRICS & GYNECOLOGY
Payer: COMMERCIAL

## 2020-04-20 ENCOUNTER — TELEPHONE (OUTPATIENT)
Dept: UROLOGY | Facility: HOSPITAL | Age: 45
End: 2020-04-20

## 2020-04-20 RX ORDER — METRONIDAZOLE 7.5 MG/G
5 GEL VAGINAL NIGHTLY
Qty: 1 TUBE | Refills: 1 | Status: SHIPPED | OUTPATIENT
Start: 2020-04-20 | End: 2020-04-25

## 2020-04-20 NOTE — TELEPHONE ENCOUNTER
Pt calling today w/ c/o vaginitis sx. Pt has vaginal irritation, burning and white discharge. Questioned pt about a possible yeast infection. Has previously been treated for vaginitis, and knows sx. LYNN Adan metrogel 1 application X 5 days. Carmen

## 2020-04-23 ENCOUNTER — APPOINTMENT (OUTPATIENT)
Dept: PHYSICAL THERAPY | Age: 45
End: 2020-04-23
Attending: OBSTETRICS & GYNECOLOGY
Payer: COMMERCIAL

## 2020-04-24 RX ORDER — FLUCONAZOLE 150 MG/1
150 TABLET ORAL
Qty: 2 TABLET | Refills: 0 | Status: SHIPPED | OUTPATIENT
Start: 2020-04-24 | End: 2020-06-26 | Stop reason: ALTCHOICE

## 2020-04-24 NOTE — TELEPHONE ENCOUNTER
Pt c/o sx are not getting better w/ the metrogel. Feels perhaps it is a yeast infection after all. TORB Dr Teressa Stephenson Diflucan 150mg po q 3 days x 2 doses.

## 2020-04-27 ENCOUNTER — VIRTUAL PHONE E/M (OUTPATIENT)
Dept: INTERNAL MEDICINE CLINIC | Facility: CLINIC | Age: 45
End: 2020-04-27

## 2020-04-27 VITALS — WEIGHT: 216 LBS | BODY MASS INDEX: 40 KG/M2

## 2020-04-27 DIAGNOSIS — E66.01 OBESITY, MORBID, BMI 50 OR HIGHER (HCC): ICD-10-CM

## 2020-04-27 DIAGNOSIS — E78.5 DYSLIPIDEMIA: ICD-10-CM

## 2020-04-27 DIAGNOSIS — Z51.81 THERAPEUTIC DRUG MONITORING: Primary | ICD-10-CM

## 2020-04-27 DIAGNOSIS — E11.9 CONTROLLED TYPE 2 DIABETES MELLITUS WITHOUT COMPLICATION, WITHOUT LONG-TERM CURRENT USE OF INSULIN (HCC): ICD-10-CM

## 2020-04-27 DIAGNOSIS — E03.9 ACQUIRED HYPOTHYROIDISM: ICD-10-CM

## 2020-04-27 PROCEDURE — 99213 OFFICE O/P EST LOW 20 MIN: CPT | Performed by: NURSE PRACTITIONER

## 2020-04-27 RX ORDER — PHENTERMINE HYDROCHLORIDE 37.5 MG/1
37.5 TABLET ORAL
Qty: 30 TABLET | Refills: 1 | Status: SHIPPED | OUTPATIENT
Start: 2020-04-27 | End: 2020-06-22

## 2020-04-27 RX ORDER — SEMAGLUTIDE 1.34 MG/ML
1 INJECTION, SOLUTION SUBCUTANEOUS WEEKLY
Qty: 6 PEN | Refills: 0 | Status: SHIPPED | OUTPATIENT
Start: 2020-04-27 | End: 2020-06-22

## 2020-04-27 NOTE — H&P
Virtual Telephone Check-In    Loyda Smart verbally {consents to/declines (Can be done by front staff):2969} a Virtual/Telephone Check-In visit on 4/27/2020.     Patient understands and accepts financial responsibility for any deductible, co- (Optional):3723}  Continue with medications:   · --advised of side effects and adverse effects of this medication  · --advised to check BP and pulse at home-->Reviewed parameters to contact office for any symptoms or abnormal vitals.   · Will start tracking

## 2020-04-27 NOTE — PATIENT INSTRUCTIONS
We are here to support you with weight loss, but please remember that you still need your primary care provider for your routine health maintenance.       PLAN:  Continue with phentermine 37.5mg, ozempic 1mg weekly and metformin  Follow up with me in 2 khadijah ** Daily INPUT> Look at nutrition section-- \"nutrients\" and it will break down your macros for the day (ie. Protein, carbs, fibers, sugars and fats). Try to stay within these numbers daily     2.  \"7 minute workout\" to help with exercis

## 2020-04-27 NOTE — PROGRESS NOTES
Virtual Telephone Check-In    Violette Macias verbally consents to a Virtual/Telephone Check-In visit on 4/27/2020.     Patient understands and accepts financial responsibility for any deductible, co-insurance and/or co-pays associated with this breathing    Assessment/Plan:   Diagnoses and all orders for this visit:    Therapeutic drug monitoring  BMI 40.0-44.9, adult ([facility] [address]  See below     Controlled type 2 diabetes mellitus without complication, without long-term current use of insulin ([facility] [address]  [place]

## 2020-04-30 ENCOUNTER — APPOINTMENT (OUTPATIENT)
Dept: PHYSICAL THERAPY | Age: 45
End: 2020-04-30
Attending: OBSTETRICS & GYNECOLOGY
Payer: COMMERCIAL

## 2020-05-21 ENCOUNTER — OFFICE VISIT (OUTPATIENT)
Dept: PHYSICAL THERAPY | Age: 45
End: 2020-05-21
Attending: OBSTETRICS & GYNECOLOGY
Payer: COMMERCIAL

## 2020-05-21 PROCEDURE — 97112 NEUROMUSCULAR REEDUCATION: CPT

## 2020-05-21 PROCEDURE — 97110 THERAPEUTIC EXERCISES: CPT

## 2020-05-21 NOTE — PROGRESS NOTES
Dx: Female stress incontinence (N39.3) Urge incontinence (N39.41)      Insurance (Authorized # of Visits):  8 recommended           Authorizing Physician: Dr. Johnny Mercado  Next MD visit: none scheduled  Fall Risk: standard         Precautions: n/a imagery  PF 5 sec x 5  PF 10 sec x 10  PF pre-activation with alternating BKFO x 10 on each side  Instruct cleaning of vaginal sensor.                       HEP: 5/21/2020 pelvic floor facilitation: hip add jc with bridge, seated hip ER with blue band, PF intermittent at bladder above pubic bone   Amount of leakage: small  Pad use: none    BOWEL HABITS  Types of symptoms: Constipation and normal  Frequency of bowel movements: daily, every two weeks experience constipation. Taking Benefiber and Metamucil.  St significant increase tone    Today's Treatment and Response:   Patient education was provided on objective findings of external and internal evaluation and expectations with treatment outcomes.  Educated on instructed in bladder, bowel, and diet diary log a certification required: Yes  I certify the need for these services furnished under this plan of treatment and while under my care.     X___________________________________________________ Date____________________    Certification From: 6/1/2961  To:6/1/2020

## 2020-05-28 ENCOUNTER — APPOINTMENT (OUTPATIENT)
Dept: PHYSICAL THERAPY | Age: 45
End: 2020-05-28
Attending: OBSTETRICS & GYNECOLOGY
Payer: COMMERCIAL

## 2020-06-04 ENCOUNTER — OFFICE VISIT (OUTPATIENT)
Dept: PHYSICAL THERAPY | Age: 45
End: 2020-06-04
Attending: OBSTETRICS & GYNECOLOGY
Payer: COMMERCIAL

## 2020-06-04 PROCEDURE — 97112 NEUROMUSCULAR REEDUCATION: CPT

## 2020-06-04 PROCEDURE — 97110 THERAPEUTIC EXERCISES: CPT

## 2020-06-04 NOTE — PROGRESS NOTES
Dx: Female stress incontinence (N39.3) Urge incontinence (N39.41)      Insurance (Authorized # of Visits):  8 recommended           Authorizing Physician: Dr. Dolly Wolf MD visit: none scheduled  Fall Risk: standard         Precautions: n/a Education on finding pelvic floor contraction with clock visual imagery  PF 5 sec x 5  PF 10 sec x 10  PF pre-activation with alternating BKFO x 10 on each side       PF pre-activation table top x 10 alternating       PF pre-activation UE raises x 10  Sit Events associated with the onset of urinary complaints: coughing, sneezing, urgency, vomiting, intercourse specific to 4 point  Abdominal/Vaginal Pressure complaints: intermittent at bladder above pubic bone   Amount of leakage: small  Pad use: none    BOW Pelvic Floor Muscle strength: (PERF= Power/Endurance/Reps/Fast) MMT: 3+/5/5/10 fast in 10 sec with weak contraction.  Patient demonstrates strong contraction posteriorly  Accessory Muscle Use: gluteals    Tissue Laxity Test:  Anterior Wall: WNL  Posterior W Patient  was advised of these findings, precautions, and treatment options and has agreed to actively participate in planning and for this course of care.     Thank you for your referral. Please co-sign or sign and return this letter via fax as soon as poss

## 2020-06-11 ENCOUNTER — APPOINTMENT (OUTPATIENT)
Dept: PHYSICAL THERAPY | Age: 45
End: 2020-06-11
Attending: OBSTETRICS & GYNECOLOGY
Payer: COMMERCIAL

## 2020-06-11 NOTE — PROGRESS NOTES
Dx: Female stress incontinence (N39.3) Urge incontinence (N39.41)      Insurance (Authorized # of Visits):  8 recommended           Authorizing Physician: Dr. Francy Brown  Next MD visit: none scheduled  Fall Risk: standard         Precautions: n/a Education on finding pelvic floor contraction with clock visual imagery  PF 5 sec x 5  PF 10 sec x 10  PF pre-activation with alternating BKFO x 10 on each side       PF pre-activation table top x 10 alternating       PF pre-activation UE raises x 10  Sit Events associated with the onset of urinary complaints: coughing, sneezing, urgency, vomiting, intercourse specific to 4 point  Abdominal/Vaginal Pressure complaints: intermittent at bladder above pubic bone   Amount of leakage: small  Pad use: none    BOW Pelvic Floor Muscle strength: (PERF= Power/Endurance/Reps/Fast) MMT: 3+/5/5/10 fast in 10 sec with weak contraction.  Patient demonstrates strong contraction posteriorly  Accessory Muscle Use: gluteals    Tissue Laxity Test:  Anterior Wall: WNL  Posterior W Patient  was advised of these findings, precautions, and treatment options and has agreed to actively participate in planning and for this course of care.     Thank you for your referral. Please co-sign or sign and return this letter via fax as soon as poss

## 2020-06-22 ENCOUNTER — VIRTUAL PHONE E/M (OUTPATIENT)
Dept: INTERNAL MEDICINE CLINIC | Facility: CLINIC | Age: 45
End: 2020-06-22

## 2020-06-22 VITALS — WEIGHT: 219 LBS | BODY MASS INDEX: 40 KG/M2

## 2020-06-22 DIAGNOSIS — Z51.81 THERAPEUTIC DRUG MONITORING: Primary | ICD-10-CM

## 2020-06-22 DIAGNOSIS — E11.9 CONTROLLED TYPE 2 DIABETES MELLITUS WITHOUT COMPLICATION, WITHOUT LONG-TERM CURRENT USE OF INSULIN (HCC): ICD-10-CM

## 2020-06-22 DIAGNOSIS — E78.5 DYSLIPIDEMIA: ICD-10-CM

## 2020-06-22 DIAGNOSIS — E66.01 OBESITY, MORBID, BMI 50 OR HIGHER (HCC): ICD-10-CM

## 2020-06-22 DIAGNOSIS — E03.9 ACQUIRED HYPOTHYROIDISM: ICD-10-CM

## 2020-06-22 PROCEDURE — 99213 OFFICE O/P EST LOW 20 MIN: CPT | Performed by: NURSE PRACTITIONER

## 2020-06-22 RX ORDER — SEMAGLUTIDE 1.34 MG/ML
1 INJECTION, SOLUTION SUBCUTANEOUS WEEKLY
Qty: 6 PEN | Refills: 0 | Status: SHIPPED | OUTPATIENT
Start: 2020-06-22 | End: 2020-12-04

## 2020-06-22 RX ORDER — PHENTERMINE HYDROCHLORIDE 15 MG/1
15 CAPSULE ORAL EVERY MORNING
Qty: 30 CAPSULE | Refills: 1 | Status: SHIPPED | OUTPATIENT
Start: 2020-06-22 | End: 2020-07-20 | Stop reason: ALTCHOICE

## 2020-06-22 NOTE — PATIENT INSTRUCTIONS
We are here to support you with weight loss, but please remember that you still need your primary care provider for your routine health maintenance.       PLAN:  Will continue with ozempic 1mg and metformin and decrease phentermine 15mg   Follow up with me Activity level: not very active (can't count exercise towards calorie number per day)                   ** Daily INPUT> Look at nutrition section-- \"nutrients\" and it will break down your macros for the day (ie.  Protein, carbs, fibers, suga

## 2020-06-22 NOTE — PROGRESS NOTES
Virtual Telephone Check-In    Domo Montserratzay verbally consents to a Virtual/Telephone Check-In visit on 6/22/2020.     Patient understands and accepts financial responsibility for any deductible, co-insurance and/or co-pays associated with this visit:    Therapeutic drug monitoring  BMI 40.0-44.9, adult ([facility]  See below     Controlled type 2 diabetes mellitus without complication, without long-term current use of insulin ([facility]  Last a1c 6.1% on 1/2019  Is currently taking ozempic 1mg weekly  Oma Dunn

## 2020-06-25 ENCOUNTER — APPOINTMENT (OUTPATIENT)
Dept: PHYSICAL THERAPY | Age: 45
End: 2020-06-25
Attending: OBSTETRICS & GYNECOLOGY
Payer: COMMERCIAL

## 2020-06-25 PROBLEM — Z20.822 SUSPECTED SEVERE ACUTE RESPIRATORY SYNDROME CORONAVIRUS 2 (SARS-COV-2) INFECTION: Status: ACTIVE | Noted: 2020-06-03

## 2020-06-25 PROBLEM — Z20.822 EXPOSURE TO 2019 NOVEL CORONAVIRUS: Status: ACTIVE | Noted: 2020-06-03

## 2020-06-26 ENCOUNTER — OFFICE VISIT (OUTPATIENT)
Dept: FAMILY MEDICINE CLINIC | Facility: CLINIC | Age: 45
End: 2020-06-26
Payer: COMMERCIAL

## 2020-06-26 VITALS
RESPIRATION RATE: 15 BRPM | SYSTOLIC BLOOD PRESSURE: 142 MMHG | WEIGHT: 220 LBS | OXYGEN SATURATION: 100 % | TEMPERATURE: 98 F | DIASTOLIC BLOOD PRESSURE: 92 MMHG | BODY MASS INDEX: 40 KG/M2 | HEART RATE: 76 BPM

## 2020-06-26 DIAGNOSIS — E11.59 HYPERTENSION ASSOCIATED WITH DIABETES (HCC): ICD-10-CM

## 2020-06-26 DIAGNOSIS — I15.2 HYPERTENSION ASSOCIATED WITH DIABETES (HCC): ICD-10-CM

## 2020-06-26 DIAGNOSIS — E11.9 CONTROLLED TYPE 2 DIABETES MELLITUS WITHOUT COMPLICATION, WITHOUT LONG-TERM CURRENT USE OF INSULIN (HCC): Primary | ICD-10-CM

## 2020-06-26 DIAGNOSIS — Z12.31 ENCOUNTER FOR SCREENING MAMMOGRAM FOR BREAST CANCER: ICD-10-CM

## 2020-06-26 PROCEDURE — 99214 OFFICE O/P EST MOD 30 MIN: CPT | Performed by: EMERGENCY MEDICINE

## 2020-06-26 RX ORDER — ATORVASTATIN CALCIUM 10 MG/1
10 TABLET, FILM COATED ORAL NIGHTLY
Qty: 90 TABLET | Refills: 0 | Status: SHIPPED | OUTPATIENT
Start: 2020-06-26 | End: 2020-12-01

## 2020-06-26 RX ORDER — LISINOPRIL 10 MG/1
10 TABLET ORAL DAILY
Qty: 90 TABLET | Refills: 0 | Status: SHIPPED | OUTPATIENT
Start: 2020-06-26 | End: 2020-09-24 | Stop reason: ALTCHOICE

## 2020-06-26 NOTE — PATIENT INSTRUCTIONS
Thank you for choosing [facility]  To Do:  [address]    · arrange for diabetic eye exam, Dr Naomie Haley  · [facility]  · Continue follow-up with weight loss clinic  · start atorvastatin 10 mg after blo in every meal. They are an important part of a healthy diet. Fat  Fat is an energy source that can be stored until needed. Fat does not raise blood sugar. But it can raise blood cholesterol. This increases the risk of heart disease.  Fat is high in calorie and eggs. These foods have cholesterol. They can be high in saturated fat. Aim for lean, lower-fat choices. Don't eat fried foods. Vianca last reviewed this educational content on 4/1/2019  © 7869-9576 [employer].  [address] Reward yourself for success. Even if you don’t reach every goal, give yourself credit for what you do get done. Get support  Encouragement from others can help make losing weight easier. Ask your family members and friends for support.  They may even want burns calories). · Regular exercise can increase the amount of muscle in your body. Muscle burns calories faster than fat. The more muscle you have, the more calories you burn. · Exercise gives you energy and curbs your appetite.   · Exercise decreases st of 2½ to 3½ grams of fat for each 100 calories you eat. Eat more fiber  High-fiber foods are digested more slowly than low-fiber foods, so you feel full longer. Try to get at least 25 grams of fiber each day for a 2000 calorie diet.  Foods high in fiber i will probably only frustrate you. Find a friend who also needs to lose some weight and you can encourage each other. Stay motivated  Here are suggestions to keep you motivated:   · Remind yourself of your goals.  Post them near the refrigerator or desk wh [facility], [address]. All rights reserved. This information is not intended as a substitute for professional medical care. Always follow your healthcare professional's instructions.

## 2020-06-26 NOTE — PROGRESS NOTES
Chief Complaint:   Patient presents with:  Diabetes: F/u   Blood Pressure: Discuss high B/P    HPI:   This is a 54year old female         DIABETES  Currrently on Metformin  500mg BID. Placed on Ozempic for weight loss. Has lost 50 pounds the last year. Patient Active Problem List:     S/P hysterectomy     Family history of breast cancer     Obesity, morbid, BMI 50 or higher ([facility]     Diabetic acidosis, type II ([address]     Therapeutic drug monitoring     Controlled type 2 diabetes mellitus without compl INTERNAL  - HEMOGLOBIN A1C; Future  - atorvastatin 10 MG Oral Tab; Take 1 tablet (10 mg total) by mouth nightly. Dispense: 90 tablet;  Refill: 0  - Blood Glucose Monitoring Suppl (ACCU-CHEK THERESA PLUS) w/Device Does not apply Kit; 1 Device by Other route 3

## 2020-06-28 DIAGNOSIS — E11.9 CONTROLLED TYPE 2 DIABETES MELLITUS WITHOUT COMPLICATION, WITHOUT LONG-TERM CURRENT USE OF INSULIN (HCC): ICD-10-CM

## 2020-06-28 PROBLEM — E11.59 HYPERTENSION ASSOCIATED WITH DIABETES (HCC): Status: ACTIVE | Noted: 2020-06-28

## 2020-06-28 PROBLEM — I15.2 HYPERTENSION ASSOCIATED WITH DIABETES: Status: ACTIVE | Noted: 2020-06-28

## 2020-06-28 PROBLEM — E11.59 HYPERTENSION ASSOCIATED WITH DIABETES: Status: ACTIVE | Noted: 2020-06-28

## 2020-06-28 PROBLEM — I15.2 HYPERTENSION ASSOCIATED WITH DIABETES (HCC): Status: ACTIVE | Noted: 2020-06-28

## 2020-06-28 RX ORDER — BLOOD-GLUCOSE METER
1 EACH MISCELLANEOUS
Qty: 1 KIT | Refills: 0 | Status: SHIPPED | OUTPATIENT
Start: 2020-06-28 | End: 2021-06-28

## 2020-06-28 RX ORDER — BLOOD SUGAR DIAGNOSTIC
STRIP MISCELLANEOUS
Qty: 100 STRIP | Refills: 1 | Status: SHIPPED | OUTPATIENT
Start: 2020-06-28 | End: 2020-06-29

## 2020-06-28 RX ORDER — LANCETS
1 EACH MISCELLANEOUS
Qty: 1 BOX | Refills: 0 | Status: SHIPPED | OUTPATIENT
Start: 2020-06-28 | End: 2020-06-29

## 2020-06-29 ENCOUNTER — LAB ENCOUNTER (OUTPATIENT)
Dept: LAB | Age: 45
End: 2020-06-29
Attending: EMERGENCY MEDICINE
Payer: COMMERCIAL

## 2020-06-29 DIAGNOSIS — Z51.81 THERAPEUTIC DRUG MONITORING: ICD-10-CM

## 2020-06-29 DIAGNOSIS — E66.01 OBESITY, MORBID, BMI 50 OR HIGHER (HCC): ICD-10-CM

## 2020-06-29 DIAGNOSIS — E11.9 CONTROLLED TYPE 2 DIABETES MELLITUS WITHOUT COMPLICATION, WITHOUT LONG-TERM CURRENT USE OF INSULIN (HCC): ICD-10-CM

## 2020-06-29 LAB
ALBUMIN SERPL-MCNC: 3.7 G/DL (ref 3.4–5)
ALBUMIN/GLOB SERPL: 1 {RATIO} (ref 1–2)
ALP LIVER SERPL-CCNC: 45 U/L (ref 39–100)
ALT SERPL-CCNC: 21 U/L (ref 13–56)
ANION GAP SERPL CALC-SCNC: 4 MMOL/L (ref 0–18)
AST SERPL-CCNC: 12 U/L (ref 15–37)
BILIRUB SERPL-MCNC: 0.7 MG/DL (ref 0.1–2)
BUN BLD-MCNC: 14 MG/DL (ref 7–18)
BUN/CREAT SERPL: 15.7 (ref 10–20)
CALCIUM BLD-MCNC: 9.1 MG/DL (ref 8.5–10.1)
CHLORIDE SERPL-SCNC: 108 MMOL/L (ref 98–112)
CHOLEST SMN-MCNC: 194 MG/DL (ref ?–200)
CO2 SERPL-SCNC: 27 MMOL/L (ref 21–32)
CREAT BLD-MCNC: 0.89 MG/DL (ref 0.55–1.02)
CREAT UR-SCNC: 229 MG/DL
EST. AVERAGE GLUCOSE BLD GHB EST-MCNC: 117 MG/DL (ref 68–126)
GLOBULIN PLAS-MCNC: 3.8 G/DL (ref 2.8–4.4)
GLUCOSE BLD-MCNC: 84 MG/DL (ref 70–99)
HBA1C MFR BLD HPLC: 5.7 % (ref ?–5.7)
HDLC SERPL-MCNC: 61 MG/DL (ref 40–59)
LDLC SERPL CALC-MCNC: 120 MG/DL (ref ?–100)
M PROTEIN MFR SERPL ELPH: 7.5 G/DL (ref 6.4–8.2)
MICROALBUMIN UR-MCNC: 0.9 MG/DL
MICROALBUMIN/CREAT 24H UR-RTO: 3.9 UG/MG (ref ?–30)
NONHDLC SERPL-MCNC: 133 MG/DL (ref ?–130)
OSMOLALITY SERPL CALC.SUM OF ELEC: 288 MOSM/KG (ref 275–295)
PATIENT FASTING Y/N/NP: YES
PATIENT FASTING Y/N/NP: YES
POTASSIUM SERPL-SCNC: 4 MMOL/L (ref 3.5–5.1)
SODIUM SERPL-SCNC: 139 MMOL/L (ref 136–145)
TRIGL SERPL-MCNC: 63 MG/DL (ref 30–149)
VLDLC SERPL CALC-MCNC: 13 MG/DL (ref 0–30)

## 2020-06-29 PROCEDURE — 80053 COMPREHEN METABOLIC PANEL: CPT

## 2020-06-29 PROCEDURE — 36415 COLL VENOUS BLD VENIPUNCTURE: CPT

## 2020-06-29 PROCEDURE — 80061 LIPID PANEL: CPT

## 2020-06-29 PROCEDURE — 83036 HEMOGLOBIN GLYCOSYLATED A1C: CPT

## 2020-06-29 PROCEDURE — 82043 UR ALBUMIN QUANTITATIVE: CPT

## 2020-06-29 PROCEDURE — 82570 ASSAY OF URINE CREATININE: CPT

## 2020-06-29 RX ORDER — LANCETS
EACH MISCELLANEOUS
Qty: 300 EACH | Refills: 0 | Status: SHIPPED | OUTPATIENT
Start: 2020-06-29

## 2020-06-29 RX ORDER — BLOOD SUGAR DIAGNOSTIC
STRIP MISCELLANEOUS
Qty: 300 STRIP | Refills: 0 | Status: SHIPPED | OUTPATIENT
Start: 2020-06-29

## 2020-06-30 DIAGNOSIS — E78.5 DYSLIPIDEMIA: Primary | ICD-10-CM

## 2020-07-14 ENCOUNTER — APPOINTMENT (OUTPATIENT)
Dept: PHYSICAL THERAPY | Age: 45
End: 2020-07-14
Attending: OBSTETRICS & GYNECOLOGY
Payer: COMMERCIAL

## 2020-07-14 NOTE — PROGRESS NOTES
{POC Header:6280} Summary  Pt has attended***, cancelled ***, and no shown *** visits in {EDW THERAPY LIST:9213}.      Subjective: ***    Assessment: ***    Objective: ***    Goals:   ***    Rehab Potential: {GOOD:115}    Plan: Continue skilled {EDW THERA Patient will demonstrate pelvic floor contraction average 10.0 mvc in 10 sec and 10 reps to decrease urinary incontinence. Patient will report no leaking with urgency when utilizing urge deferment exercises.   Patient will be independent in appropriate b Diagnosis: Female stress incontinence (N39.3) Urge incontinence (N39.41)     Date of Service: 3/3/2020     PATIENT SUMMARY   Gabi Victoria is a 54year old female  who presents to therapy today with complaints of UI urgency, cough, sneeze and Tk Rey presents to physical therapy evaluation with primary c/o DARREN and urge UI. The results of the objective tests and measures show weakness in anterior pelvic floor muscles.   Functional deficits include but are not limited to leaking with cough, sneeze Patient was instructed in and issued a HEP for: diet/bladder/bowel diary.  Pelvic floor facilitation exercises: hip add jc and seated hip ER    Charges: PT Eval Low Complexity, Ex       Total Timed Treatment: 45 min     Total Treatment Time: 45 min     PL

## 2020-07-20 ENCOUNTER — OFFICE VISIT (OUTPATIENT)
Dept: FAMILY MEDICINE CLINIC | Facility: CLINIC | Age: 45
End: 2020-07-20
Payer: COMMERCIAL

## 2020-07-20 VITALS
OXYGEN SATURATION: 98 % | SYSTOLIC BLOOD PRESSURE: 126 MMHG | HEART RATE: 73 BPM | TEMPERATURE: 97 F | RESPIRATION RATE: 17 BRPM | BODY MASS INDEX: 41.56 KG/M2 | WEIGHT: 223 LBS | DIASTOLIC BLOOD PRESSURE: 84 MMHG | HEIGHT: 61.5 IN

## 2020-07-20 DIAGNOSIS — Z13.0 SCREENING FOR ENDOCRINE, NUTRITIONAL, METABOLIC AND IMMUNITY DISORDER: ICD-10-CM

## 2020-07-20 DIAGNOSIS — Z13.228 SCREENING FOR ENDOCRINE, NUTRITIONAL, METABOLIC AND IMMUNITY DISORDER: ICD-10-CM

## 2020-07-20 DIAGNOSIS — N63.20 LEFT BREAST LUMP: ICD-10-CM

## 2020-07-20 DIAGNOSIS — Z12.31 ENCOUNTER FOR SCREENING MAMMOGRAM FOR BREAST CANCER: Primary | ICD-10-CM

## 2020-07-20 DIAGNOSIS — E11.59 HYPERTENSION ASSOCIATED WITH DIABETES (HCC): ICD-10-CM

## 2020-07-20 DIAGNOSIS — Z80.3 FAMILY HISTORY OF BREAST CANCER: ICD-10-CM

## 2020-07-20 DIAGNOSIS — Z13.21 SCREENING FOR ENDOCRINE, NUTRITIONAL, METABOLIC AND IMMUNITY DISORDER: ICD-10-CM

## 2020-07-20 DIAGNOSIS — Z13.29 SCREENING FOR ENDOCRINE, NUTRITIONAL, METABOLIC AND IMMUNITY DISORDER: ICD-10-CM

## 2020-07-20 DIAGNOSIS — I15.2 HYPERTENSION ASSOCIATED WITH DIABETES (HCC): ICD-10-CM

## 2020-07-20 DIAGNOSIS — E11.9 CONTROLLED TYPE 2 DIABETES MELLITUS WITHOUT COMPLICATION, WITHOUT LONG-TERM CURRENT USE OF INSULIN (HCC): ICD-10-CM

## 2020-07-20 PROBLEM — E11.10 DIABETIC ACIDOSIS, TYPE II (HCC): Status: RESOLVED | Noted: 2018-07-23 | Resolved: 2020-07-20

## 2020-07-20 PROBLEM — E66.01 OBESITY, MORBID, BMI 50 OR HIGHER (HCC): Status: RESOLVED | Noted: 2018-07-17 | Resolved: 2020-07-20

## 2020-07-20 PROCEDURE — 99396 PREV VISIT EST AGE 40-64: CPT | Performed by: EMERGENCY MEDICINE

## 2020-07-20 PROCEDURE — 3074F SYST BP LT 130 MM HG: CPT | Performed by: EMERGENCY MEDICINE

## 2020-07-20 PROCEDURE — 3079F DIAST BP 80-89 MM HG: CPT | Performed by: EMERGENCY MEDICINE

## 2020-07-20 PROCEDURE — 3008F BODY MASS INDEX DOCD: CPT | Performed by: EMERGENCY MEDICINE

## 2020-07-20 NOTE — PROGRESS NOTES
Marta Cannon is a 54year old female who presents for a complete physical exam.   HPI:     Patient presents with:  Physical: Annual physical     Age: 54    1First day of last menstrual period (or first year of         menstruation, if thro years? YES       e. Have you had a tetanus shot  the past 10 years? YES       f. Does your house have a working smoke detector? YES        g. Do you have firearms at home? NO        h.  Have you ever had a mammogram?  YES     If yes, date of last Grandmother    • Breast Cancer Maternal Grandmother    • Diabetes Father    • Thyroid disease Mother    • Breast Cancer Maternal Aunt       Social History    Tobacco Use      Smoking status: Never Smoker      Smokeless tobacco: Never Used    Alcohol use:  Lisa Garcia pain or sore throat; hearing loss negative,   RESPIRATORY: denies shortness of breath, wheezing or cough   CARDIOVASCULAR: denies chest pain, SOB, edema,orthopnea, no palpitations   GI: denies nausea, vomiting, constipation, diarrhea; no rectal bleeding; n diabetes mellitus without complication, without long-term current use of insulin ([facility] [address]  Continue with all meds  Arrange for DM eye exam  - OPHTHALMOLOGY - INTERNAL    3. Left breast lump  - GALLO DIAGNOSTIC BILATERAL (CPT=77066); Future    4.  Family history done in 2-3 weeks  4. Continue with all meds  5. Follow up in 6 months  6.  Arrange for Diabetic Eye Exam, Dr. Blanco Poisson UP:  6 months

## 2020-07-20 NOTE — PATIENT INSTRUCTIONS
Thank you for choosing [facility]  To Do:  FOR EFREN ESTRELLA        1. Follow up with genetic counselor  2. Follow up with weight loss clinic  3. Have repeat cholesterol test done in 2-3 weeks  4. Continue with all meds  5.  Follow u Calcium (mg) per serving   Source   Calcium (mg) per serving   Source   Calcium (mg) per serving      Low-fat yogurt, plain   415 mg/8 oz.   Sardines, Atlantic, canned, with bones   351 mg/3 oz.   Oatmeal, instant, fortified   215 mg/1 cup   Nonfat milk   women in this age group At routine exams   Blood pressure All women in this age group Every 2 years if your blood pressure is less than 120/80 mm Hg; yearly if your systolic blood pressure is 120 to 139 mm Hg, or your diastolic blood pressure reading is 80 increased risk for infection – talk with your healthcare provider 2 doses given 6 months apart   Hepatitis B Women at increased risk for infection – talk with your healthcare provider 3 doses over 6 months; second dose should be given 1 month after the [facility] [address], [address] [place] [address]. All rights reserved. This information is not intended as a substitute for professional medical care. Always follow your healthcare professional's instructions.

## 2020-07-27 ENCOUNTER — VIRTUAL PHONE E/M (OUTPATIENT)
Dept: INTERNAL MEDICINE CLINIC | Facility: CLINIC | Age: 45
End: 2020-07-27

## 2020-07-27 VITALS — BODY MASS INDEX: 41 KG/M2 | WEIGHT: 219 LBS

## 2020-07-27 DIAGNOSIS — E03.9 ACQUIRED HYPOTHYROIDISM: ICD-10-CM

## 2020-07-27 DIAGNOSIS — Z51.81 THERAPEUTIC DRUG MONITORING: Primary | ICD-10-CM

## 2020-07-27 DIAGNOSIS — E66.01 OBESITY, CLASS III, BMI 40-49.9 (MORBID OBESITY) (HCC): ICD-10-CM

## 2020-07-27 DIAGNOSIS — E78.5 DYSLIPIDEMIA: ICD-10-CM

## 2020-07-27 DIAGNOSIS — E11.9 CONTROLLED TYPE 2 DIABETES MELLITUS WITHOUT COMPLICATION, WITHOUT LONG-TERM CURRENT USE OF INSULIN (HCC): ICD-10-CM

## 2020-07-27 PROCEDURE — 99213 OFFICE O/P EST LOW 20 MIN: CPT | Performed by: NURSE PRACTITIONER

## 2020-07-27 NOTE — PATIENT INSTRUCTIONS
We are here to support you with weight loss, but please remember that you still need your primary care provider for your routine health maintenance.       PLAN:  Will continue with ozempic 1mg and metformin   Follow up with me in 1 month  Schedule follow up ** Daily INPUT> Look at nutrition section-- \"nutrients\" and it will break down your macros for the day (ie. Protein, carbs, fibers, sugars and fats). Try to stay within these numbers daily     2.  \"7 minute workout\" to help with exercise/a

## 2020-07-27 NOTE — PROGRESS NOTES
Virtual Telephone Check-In    Padmini Stout verbally consents to a Virtual/Telephone Check-In visit on 7/27/2020.     Patient understands and accepts financial responsibility for any deductible, co-insurance and/or co-pays associated with this full sentences, no increased work of breathing    Assessment/Plan:   Diagnoses and all orders for this visit:    Therapeutic drug monitoring  BMI 40.0-44.9, adult ([facility] [address]  See below     Controlled type 2 diabetes mellitus without complication, without long-t

## 2020-08-10 ENCOUNTER — HOSPITAL ENCOUNTER (OUTPATIENT)
Age: 45
Discharge: HOME | End: 2020-08-10
Payer: COMMERCIAL

## 2020-08-10 VITALS
HEIGHT: 62 IN | DIASTOLIC BLOOD PRESSURE: 71 MMHG | BODY MASS INDEX: 40.48 KG/M2 | WEIGHT: 220 LBS | SYSTOLIC BLOOD PRESSURE: 103 MMHG | OXYGEN SATURATION: 97 % | TEMPERATURE: 98 F | HEART RATE: 76 BPM | RESPIRATION RATE: 18 BRPM

## 2020-08-10 DIAGNOSIS — Z20.822 EXPOSURE TO COVID-19 VIRUS: ICD-10-CM

## 2020-08-10 DIAGNOSIS — R43.0 LOSS OF SMELL: Primary | ICD-10-CM

## 2020-08-10 PROCEDURE — 99202 OFFICE O/P NEW SF 15 MIN: CPT | Performed by: NURSE PRACTITIONER

## 2020-08-10 NOTE — ED INITIAL ASSESSMENT (HPI)
Since Wed, pt has been having cough, congestion. Lost of sense of smell since SAt.    H/a  Sneezing.    Requesting covid test.

## 2020-08-10 NOTE — ED PROVIDER NOTES
Patient Seen in: [address] [facility]      History   Patient presents with:  Cough/URI    Stated Complaint: TL-URI symptoms, loss of smell, covid testing      14-year-old female who presents to the immediate care with complaints of having a no [08/10/20 0956]   /71   Pulse 76   Resp 18   Temp 97.5 °F (36.4 °C)   Temp src Tympanic   SpO2 97 %   O2 Device        Current:/71   Pulse 76   Temp 97.5 °F (36.4 °C) (Tympanic)   Resp 18   Ht 157.5 cm (5' 2\")   Wt 99.8 kg   SpO2 97%   BMI 40. as discussed and voiced understanding and all questions were answered at this time.               Disposition and Plan     Clinical Impression:  Loss of smell  (primary encounter diagnosis)  Exposure to COVID-19 virus    Disposition:  Discharge  8/10/2020 1

## 2020-08-13 LAB — SARS-COV-2 BY PCR: DETECTED

## 2020-08-13 NOTE — ED NOTES
Informed of covid result. Advised to continue quarantine for 10 days past symptom onset and 24 hours after symptoms resolve. Sts continues with body aches, cough, loss of taste.  Advised to go to ED if symptoms become unmanageable at home or PMD if symptoms

## 2020-08-25 ENCOUNTER — TELEPHONE (OUTPATIENT)
Dept: FAMILY MEDICINE CLINIC | Facility: CLINIC | Age: 45
End: 2020-08-25

## 2020-08-25 NOTE — TELEPHONE ENCOUNTER
Patient dropped off [facility] paperwork for completion. Pt is needing it to be completed by Monday of next week. Please call Pt when paperwork is ready, she will pick it up. Thank you.

## 2020-08-26 ENCOUNTER — OFFICE VISIT (OUTPATIENT)
Dept: FAMILY MEDICINE CLINIC | Facility: CLINIC | Age: 45
End: 2020-08-26
Payer: COMMERCIAL

## 2020-08-26 VITALS
OXYGEN SATURATION: 96 % | SYSTOLIC BLOOD PRESSURE: 110 MMHG | RESPIRATION RATE: 20 BRPM | BODY MASS INDEX: 41.59 KG/M2 | WEIGHT: 226 LBS | DIASTOLIC BLOOD PRESSURE: 76 MMHG | TEMPERATURE: 99 F | HEIGHT: 62 IN | HEART RATE: 84 BPM

## 2020-08-26 DIAGNOSIS — U07.1 COVID-19 VIRUS INFECTION: Primary | ICD-10-CM

## 2020-08-26 PROCEDURE — 99213 OFFICE O/P EST LOW 20 MIN: CPT | Performed by: FAMILY MEDICINE

## 2020-08-26 PROCEDURE — 3008F BODY MASS INDEX DOCD: CPT | Performed by: FAMILY MEDICINE

## 2020-08-26 PROCEDURE — 3078F DIAST BP <80 MM HG: CPT | Performed by: FAMILY MEDICINE

## 2020-08-26 PROCEDURE — 3074F SYST BP LT 130 MM HG: CPT | Performed by: FAMILY MEDICINE

## 2020-08-26 NOTE — PROGRESS NOTES
[facility] Family Medicine Office Note  Chief Complaint:   Patient presents with:   Follow - Up: return to work       HPI:   This is a 54year old female coming in for COVID-19 follow up.   -Day 1 of symptoms - 08/05/20  -Returned to work - 08/26/ Review of Systems   Constitutional: Negative for activity change, appetite change, chills, fatigue, fever and unexpected weight change. HENT: Negative for ear pain, hearing loss, rhinorrhea, sore throat and trouble swallowing.     Eyes: Negative for walter sounds normal. No respiratory distress. She has no wheezes. She has no rales. Abdominal: Soft. Bowel sounds are normal. She exhibits no distension. There is no tenderness. There is no rebound and no guarding. Musculoskeletal: Normal range of motion.  She hysterectomy     Family history of breast cancer     Therapeutic drug monitoring     Controlled type 2 diabetes mellitus without complication, without long-term current use of insulin ([facility] [address]     Morbid obesity with BMI of 45.0-49.9, adult ([facility]     Wanda Bass

## 2020-08-26 NOTE — PATIENT INSTRUCTIONS
1. [facility] paperwork for COVID-19 illness completed. 2. Advised to follow up with Dr. Caitlyn Tubbs for T2DM [place] paperwork. 3. Cleared to return to work >10 days since symptoms, no fevers >72 hours.

## 2020-08-31 ENCOUNTER — APPOINTMENT (OUTPATIENT)
Dept: GENERAL RADIOLOGY | Age: 45
End: 2020-08-31
Attending: PHYSICIAN ASSISTANT
Payer: COMMERCIAL

## 2020-08-31 ENCOUNTER — HOSPITAL ENCOUNTER (OUTPATIENT)
Age: 45
Discharge: HOME | End: 2020-08-31
Payer: COMMERCIAL

## 2020-08-31 VITALS
RESPIRATION RATE: 16 BRPM | OXYGEN SATURATION: 100 % | HEART RATE: 77 BPM | SYSTOLIC BLOOD PRESSURE: 116 MMHG | TEMPERATURE: 98 F | DIASTOLIC BLOOD PRESSURE: 66 MMHG

## 2020-08-31 DIAGNOSIS — U07.1 COVID-19: Primary | ICD-10-CM

## 2020-08-31 DIAGNOSIS — J98.01 BRONCHOSPASM: ICD-10-CM

## 2020-08-31 PROCEDURE — 71046 X-RAY EXAM CHEST 2 VIEWS: CPT | Performed by: PHYSICIAN ASSISTANT

## 2020-08-31 PROCEDURE — 99213 OFFICE O/P EST LOW 20 MIN: CPT | Performed by: PHYSICIAN ASSISTANT

## 2020-08-31 RX ORDER — ALBUTEROL SULFATE 90 UG/1
2 AEROSOL, METERED RESPIRATORY (INHALATION) EVERY 4 HOURS PRN
Qty: 1 INHALER | Refills: 0 | Status: SHIPPED | OUTPATIENT
Start: 2020-08-31 | End: 2020-09-30

## 2020-08-31 RX ORDER — PREDNISONE 20 MG/1
40 TABLET ORAL DAILY
Qty: 10 TABLET | Refills: 0 | Status: SHIPPED | OUTPATIENT
Start: 2020-08-31 | End: 2020-09-05

## 2020-08-31 RX ORDER — BENZONATATE 100 MG/1
100 CAPSULE ORAL 3 TIMES DAILY PRN
Qty: 30 CAPSULE | Refills: 0 | Status: SHIPPED | OUTPATIENT
Start: 2020-08-31 | End: 2020-09-30

## 2020-08-31 NOTE — TELEPHONE ENCOUNTER
Patient requesting intermittent FMLA for her diabetes because she leaves work early to attend appointments. Patient states that she has other doctors that help manage her DM but that [facility] [employer] stated the forms must be completed by PCP.  I did call [employer]

## 2020-09-01 NOTE — TELEPHONE ENCOUNTER
Discussed with . One appointment every 6 months-4 hours. Does not need 9/11 appt. Spoke to patient. She is aware of allowed time off for her appointments with  only. Does not need 9/11 appt so I cancelled this.  Form completed, copy

## 2020-09-01 NOTE — ED PROVIDER NOTES
Patient Seen in: [address] [facility]      History   Patient presents with:  Cough/URI    Stated Complaint: Chest Cough- Needs f/u Covid Testing (Positive 3 weeks ago)    HPI    27-year-old female. Diabetic and thyroid dysfunction.   Tested po Oropharynx is patent without evidence of erythema, exudates or deviation. No stridor to auscultation  Lung: No distress, RR, no retraction, breath sounds are clear bilaterally.   Bronchospasm  Cardio: Regular rate and rhythm, normal S1-S2, no murmur appre

## 2020-09-02 ENCOUNTER — TELEPHONE (OUTPATIENT)
Dept: FAMILY MEDICINE CLINIC | Facility: CLINIC | Age: 45
End: 2020-09-02

## 2020-09-02 NOTE — TELEPHONE ENCOUNTER
I spoke with pt, she states she has copy of her FMLA & faxed it over to [address] herself. She does not need anything from our office.

## 2020-09-02 NOTE — TELEPHONE ENCOUNTER
Patient called and is checking on status on her FMLA she's checking if paperwork has been faxed. Call back number [phone].

## 2020-09-23 ENCOUNTER — TELEPHONE (OUTPATIENT)
Dept: FAMILY MEDICINE CLINIC | Facility: CLINIC | Age: 45
End: 2020-09-23

## 2020-09-23 DIAGNOSIS — E11.59 HYPERTENSION ASSOCIATED WITH DIABETES (HCC): Primary | ICD-10-CM

## 2020-09-23 DIAGNOSIS — I15.2 HYPERTENSION ASSOCIATED WITH DIABETES (HCC): Primary | ICD-10-CM

## 2020-09-23 NOTE — TELEPHONE ENCOUNTER
Patient continues to have a cough. Patient does take Lisinopril 10mg daily. Patient is thinking that this could be causing her cough. Please advise. Thank you!

## 2020-09-23 NOTE — TELEPHONE ENCOUNTER
Patient lvm stating that she continues to have cough and is calling to let provider know since she was advised to contact office if cough continued, she believes cough is due to med she is taking for hypertension and wants new med prescribed for condition

## 2020-09-23 NOTE — TELEPHONE ENCOUNTER
Patient has been on lisinopril for awhile  Patient also dx with Covid recently and did report lingering cough during last OV with Dr. Salcido Proper    Suspect that cough is from reactive airway disease  WOuld like pt to continue lisinopril    Patient has Rx of alb

## 2020-09-24 RX ORDER — LOSARTAN POTASSIUM 50 MG/1
50 TABLET ORAL DAILY
Qty: 90 TABLET | Refills: 0 | Status: SHIPPED | OUTPATIENT
Start: 2020-09-24 | End: 2021-01-18

## 2020-09-24 NOTE — TELEPHONE ENCOUNTER
Called patient and spoke with her. Advised her of information below. Patient states that she had the cough prior to Covid. Patient states that Covid decerebrated the cough. It is improved since then but is still present like it was prior to Covid.   Ple

## 2020-10-08 PROBLEM — H93.A1 PULSATILE TINNITUS OF RIGHT EAR: Status: ACTIVE | Noted: 2020-10-08

## 2020-10-08 PROBLEM — H93.299 ABNORMAL AUDITORY PERCEPTION, UNSPECIFIED LATERALITY: Status: ACTIVE | Noted: 2020-10-08

## 2020-11-04 ENCOUNTER — TELEMEDICINE (OUTPATIENT)
Dept: INTERNAL MEDICINE CLINIC | Facility: CLINIC | Age: 45
End: 2020-11-04

## 2020-11-04 VITALS — WEIGHT: 229 LBS | BODY MASS INDEX: 42 KG/M2

## 2020-11-04 DIAGNOSIS — Z51.81 THERAPEUTIC DRUG MONITORING: Primary | ICD-10-CM

## 2020-11-04 DIAGNOSIS — E66.01 OBESITY, CLASS III, BMI 40-49.9 (MORBID OBESITY) (HCC): ICD-10-CM

## 2020-11-04 DIAGNOSIS — I15.2 HYPERTENSION ASSOCIATED WITH DIABETES (HCC): ICD-10-CM

## 2020-11-04 DIAGNOSIS — E11.59 HYPERTENSION ASSOCIATED WITH DIABETES (HCC): ICD-10-CM

## 2020-11-04 DIAGNOSIS — R53.83 FATIGUE, UNSPECIFIED TYPE: ICD-10-CM

## 2020-11-04 DIAGNOSIS — E78.5 DYSLIPIDEMIA: ICD-10-CM

## 2020-11-04 DIAGNOSIS — R63.5 WEIGHT GAIN: ICD-10-CM

## 2020-11-04 DIAGNOSIS — E11.10 TYPE 2 DIABETES MELLITUS WITH KETOACIDOSIS WITHOUT COMA, WITHOUT LONG-TERM CURRENT USE OF INSULIN (HCC): ICD-10-CM

## 2020-11-04 DIAGNOSIS — E03.9 ACQUIRED HYPOTHYROIDISM: ICD-10-CM

## 2020-11-04 PROCEDURE — 99214 OFFICE O/P EST MOD 30 MIN: CPT | Performed by: NURSE PRACTITIONER

## 2020-11-04 RX ORDER — PHENTERMINE HYDROCHLORIDE 37.5 MG/1
37.5 TABLET ORAL
Qty: 30 TABLET | Refills: 0 | Status: SHIPPED | OUTPATIENT
Start: 2020-11-04 | End: 2020-12-04
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
## 2020-11-05 PROBLEM — Z20.822 EXPOSURE TO SEVERE ACUTE RESPIRATORY SYNDROME CORONAVIRUS 2 (SARS-COV-2): Status: ACTIVE | Noted: 2020-06-03

## 2020-11-07 ENCOUNTER — APPOINTMENT (OUTPATIENT)
Dept: GENERAL RADIOLOGY | Age: 45
End: 2020-11-07
Attending: EMERGENCY MEDICINE
Payer: COMMERCIAL

## 2020-11-07 ENCOUNTER — APPOINTMENT (OUTPATIENT)
Dept: GENERAL RADIOLOGY | Age: 45
End: 2020-11-07
Payer: COMMERCIAL

## 2020-11-07 ENCOUNTER — HOSPITAL ENCOUNTER (EMERGENCY)
Age: 45
Discharge: HOME | End: 2020-11-07
Attending: EMERGENCY MEDICINE
Payer: COMMERCIAL

## 2020-11-07 VITALS
HEART RATE: 88 BPM | OXYGEN SATURATION: 99 % | BODY MASS INDEX: 41.41 KG/M2 | WEIGHT: 225 LBS | DIASTOLIC BLOOD PRESSURE: 60 MMHG | RESPIRATION RATE: 16 BRPM | HEIGHT: 62 IN | SYSTOLIC BLOOD PRESSURE: 103 MMHG
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
## 2020-11-07 DIAGNOSIS — S80.02XA CONTUSION OF LEFT KNEE, INITIAL ENCOUNTER: ICD-10-CM

## 2020-11-07 DIAGNOSIS — S70.02XA CONTUSION OF LEFT HIP, INITIAL ENCOUNTER: ICD-10-CM

## 2020-11-07 PROCEDURE — 99284 EMERGENCY DEPT VISIT MOD MDM: CPT

## 2020-11-07 PROCEDURE — 73560 X-RAY EXAM OF KNEE 1 OR 2: CPT | Performed by: EMERGENCY MEDICINE

## 2020-11-07 PROCEDURE — 73502 X-RAY EXAM HIP UNI 2-3 VIEWS: CPT | Performed by: EMERGENCY MEDICINE

## 2020-11-07 PROCEDURE — 72110 X-RAY EXAM L-2 SPINE 4/>VWS: CPT | Performed by: EMERGENCY MEDICINE

## 2020-11-07 RX ORDER — IBUPROFEN 600 MG/1
600 TABLET ORAL ONCE
Status: COMPLETED | OUTPATIENT
Start: 2020-11-07 | End: 2020-11-07

## 2020-11-07 RX ORDER — CYCLOBENZAPRINE HCL 10 MG
10 TABLET ORAL ONCE
Status: COMPLETED | OUTPATIENT
Start: 2020-11-07 | End: 2020-11-07
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
## 2020-11-21 ENCOUNTER — LAB ENCOUNTER (OUTPATIENT)
Dept: LAB | Age: 45
End: 2020-11-21
Attending: EMERGENCY MEDICINE
Payer: COMMERCIAL

## 2020-11-21 ENCOUNTER — OFFICE VISIT (OUTPATIENT)
Dept: FAMILY MEDICINE CLINIC | Facility: CLINIC | Age: 45
End: 2020-11-21
Payer: COMMERCIAL

## 2020-11-21 VITALS
TEMPERATURE: 97 F | SYSTOLIC BLOOD PRESSURE: 114 MMHG | OXYGEN SATURATION: 99 % | RESPIRATION RATE: 15 BRPM | DIASTOLIC BLOOD PRESSURE: 66 MMHG | HEART RATE: 73 BPM | WEIGHT: 226 LBS | BODY MASS INDEX: 41 KG/M2

## 2020-11-21 DIAGNOSIS — Z13.0 SCREENING FOR ENDOCRINE, NUTRITIONAL, METABOLIC AND IMMUNITY DISORDER: ICD-10-CM

## 2020-11-21 DIAGNOSIS — E78.5 DYSLIPIDEMIA: ICD-10-CM

## 2020-11-21 DIAGNOSIS — Z13.29 SCREENING FOR ENDOCRINE, NUTRITIONAL, METABOLIC AND IMMUNITY DISORDER: ICD-10-CM

## 2020-11-21 DIAGNOSIS — Z13.228 SCREENING FOR ENDOCRINE, NUTRITIONAL, METABOLIC AND IMMUNITY DISORDER: ICD-10-CM

## 2020-11-21 DIAGNOSIS — Z23 NEED FOR PNEUMOCOCCAL VACCINATION: ICD-10-CM

## 2020-11-21 DIAGNOSIS — Z13.21 SCREENING FOR ENDOCRINE, NUTRITIONAL, METABOLIC AND IMMUNITY DISORDER: ICD-10-CM

## 2020-11-21 DIAGNOSIS — M54.50 ACUTE RIGHT-SIDED LOW BACK PAIN WITHOUT SCIATICA: Primary | ICD-10-CM

## 2020-11-21 DIAGNOSIS — E11.9 CONTROLLED TYPE 2 DIABETES MELLITUS WITHOUT COMPLICATION, WITHOUT LONG-TERM CURRENT USE OF INSULIN (HCC): ICD-10-CM

## 2020-11-21 PROCEDURE — 90732 PPSV23 VACC 2 YRS+ SUBQ/IM: CPT | Performed by: EMERGENCY MEDICINE

## 2020-11-21 PROCEDURE — 80061 LIPID PANEL: CPT

## 2020-11-21 PROCEDURE — 85025 COMPLETE CBC W/AUTO DIFF WBC: CPT

## 2020-11-21 PROCEDURE — 3078F DIAST BP <80 MM HG: CPT | Performed by: EMERGENCY MEDICINE

## 2020-11-21 PROCEDURE — 83036 HEMOGLOBIN GLYCOSYLATED A1C: CPT

## 2020-11-21 PROCEDURE — 3074F SYST BP LT 130 MM HG: CPT | Performed by: EMERGENCY MEDICINE

## 2020-11-21 PROCEDURE — 99214 OFFICE O/P EST MOD 30 MIN: CPT | Performed by: EMERGENCY MEDICINE

## 2020-11-21 PROCEDURE — 84443 ASSAY THYROID STIM HORMONE: CPT

## 2020-11-21 PROCEDURE — 80053 COMPREHEN METABOLIC PANEL: CPT

## 2020-11-21 PROCEDURE — 90471 IMMUNIZATION ADMIN: CPT | Performed by: EMERGENCY MEDICINE

## 2020-11-21 PROCEDURE — 36415 COLL VENOUS BLD VENIPUNCTURE: CPT

## 2020-11-21 RX ORDER — CYCLOBENZAPRINE HCL 10 MG
10 TABLET ORAL 3 TIMES DAILY
Qty: 30 TABLET | Refills: 1 | Status: SHIPPED | OUTPATIENT
Start: 2020-11-21 | End: 2020-12-11
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
## 2020-11-21 NOTE — PATIENT INSTRUCTIONS
Thank you for choosing [address] [place] [address] Group  To Do:  FOR EFREN ESTRELLA        1. Arrange for physical therapy  2. Ok to take ibuprofen, Take OTC ibuprofen 600-800 mg every 6-8 hours as needed for pain.  Take ibuprofen with food and stop if sto illness like appendicitis, bladder or kidney infections, pelvic infections, and many other things. Acute back pain usually gets better in 1 to 2 weeks.  Back pain related to disk disease, arthritis in the spinal joints, or narrowing of the spinal canal (sp doctor about the best treatment for your back pain. · Therapeutic massage can help relax the back muscles without stretching them.   · Be aware of safe lifting methods and don't lift anything without stretching first.  Medicines  Talk to your doctor before Sometimes low back pain can be a sign of a bigger problem. Call your healthcare provider if your pain returns often or gets worse over time. For the long-term care of your back, get regular exercise, lose any excess weight and learn good posture.   Take a s decreasing after more than a week. Vianca last reviewed this educational content on 3/1/2018  © 4758-7332 [employer]. [address] [facility], [address] [place] [address]. All rights reserved.  This information is not intended as a substitute for pro

## 2020-12-01 ENCOUNTER — TELEPHONE (OUTPATIENT)
Dept: FAMILY MEDICINE CLINIC | Facility: CLINIC | Age: 45
End: 2020-12-01

## 2020-12-01 ENCOUNTER — TELEPHONE (OUTPATIENT)
Dept: PHYSICAL THERAPY | Age: 45
End: 2020-12-01

## 2020-12-01 DIAGNOSIS — E78.5 HYPERLIPIDEMIA, UNSPECIFIED HYPERLIPIDEMIA TYPE: Primary | ICD-10-CM

## 2020-12-01 DIAGNOSIS — E11.9 CONTROLLED TYPE 2 DIABETES MELLITUS WITHOUT COMPLICATION, WITHOUT LONG-TERM CURRENT USE OF INSULIN (HCC): ICD-10-CM

## 2020-12-01 RX ORDER — ATORVASTATIN CALCIUM 10 MG/1
10 TABLET, FILM COATED ORAL NIGHTLY
Qty: 90 TABLET | Refills: 0 | Status: SHIPPED | OUTPATIENT
Start: 2020-12-01 | End: 2021-08-16

## 2020-12-01 NOTE — TELEPHONE ENCOUNTER
----- Message from Jess Little MD sent at 11/29/2020 11:07 PM CST -----  LDL not at goal, Pt needs to restart Atorvastatin 10 mg, recheck lipids in 1 month    Rest of labs stable, follow up as directed

## 2020-12-04 ENCOUNTER — TELEMEDICINE (OUTPATIENT)
Dept: INTERNAL MEDICINE CLINIC | Facility: CLINIC | Age: 45
End: 2020-12-04
Payer: COMMERCIAL

## 2020-12-04 VITALS — WEIGHT: 229 LBS | BODY MASS INDEX: 42 KG/M2

## 2020-12-04 DIAGNOSIS — I15.2 HYPERTENSION ASSOCIATED WITH DIABETES (HCC): ICD-10-CM

## 2020-12-04 DIAGNOSIS — E03.9 ACQUIRED HYPOTHYROIDISM: ICD-10-CM

## 2020-12-04 DIAGNOSIS — E11.10 TYPE 2 DIABETES MELLITUS WITH KETOACIDOSIS WITHOUT COMA, WITHOUT LONG-TERM CURRENT USE OF INSULIN (HCC): ICD-10-CM

## 2020-12-04 DIAGNOSIS — Z51.81 THERAPEUTIC DRUG MONITORING: Primary | ICD-10-CM

## 2020-12-04 DIAGNOSIS — E11.59 HYPERTENSION ASSOCIATED WITH DIABETES (HCC): ICD-10-CM

## 2020-12-04 DIAGNOSIS — E66.01 OBESITY, CLASS III, BMI 40-49.9 (MORBID OBESITY) (HCC): ICD-10-CM

## 2020-12-04 DIAGNOSIS — E78.5 DYSLIPIDEMIA: ICD-10-CM

## 2020-12-04 PROCEDURE — 99213 OFFICE O/P EST LOW 20 MIN: CPT | Performed by: NURSE PRACTITIONER

## 2020-12-04 RX ORDER — SEMAGLUTIDE 1.34 MG/ML
1 INJECTION, SOLUTION SUBCUTANEOUS WEEKLY
Qty: 6 PEN | Refills: 0 | Status: SHIPPED | OUTPATIENT
Start: 2020-12-04 | End: 2021-01-07

## 2020-12-04 RX ORDER — PHENTERMINE HYDROCHLORIDE 37.5 MG/1
37.5 TABLET ORAL
Qty: 30 TABLET | Refills: 0 | Status: SHIPPED | OUTPATIENT
Start: 2020-12-04 | End: 2021-05-10

## 2020-12-04 NOTE — PROGRESS NOTES
[facility] WEIGHT MANAGEMENT VIRTUAL ENCOUNTER     Clint Hyatt verbally consents to a Virtual/Telephone Check-In service on 12/04/20   Patient understands and accepts financial responsibility for any deductible, co-insurance and/or co- comfortably   SKIN: warm, pink, dry without rashes to exposed area   EYES: conjunctiva pink  HEENT: atraumatic, normocephalic  LUNGS: normal work of breathing, non labored  CARDIO: normal work, no exertion  EXTREMITIES: no cyanosis, no clubbing, no edema morning before breakfast., Disp: 30 tablet, Rfl: 0    •  losartan Potassium 50 MG Oral Tab, Take 1 tablet (50 mg total) by mouth daily. , Disp: 90 tablet, Rfl: 0    •  Glucose Blood (ACCU-CHEK THERESA PLUS) In Vitro Strip, TEST THREE TIMES DAILY BEFORE MEALS, ozempic, and metformin  · --advised of side effects and adverse effects of this medication  · Advised to monitor blood pressure and pulse at home/ given parameters to review and contact provider.   · Nutrition: low carb diet/ recommended to eat breakfast da

## 2020-12-04 NOTE — PATIENT INSTRUCTIONS
We are here to support you with weight loss, but please remember that you still need your primary care provider for your routine health maintenance.       PLAN:  Continue with phentermine, ozempic, and metformin  Check out zenia huddleston (online for exercise to Activity level: not very active (can't count exercise towards calorie number per day)                   ** Daily INPUT> Look at nutrition section-- \"nutrients\" and it will break down your macros for the day (ie.  Protein, carbs, fibers, suga

## 2020-12-07 ENCOUNTER — APPOINTMENT (OUTPATIENT)
Dept: PHYSICAL THERAPY | Age: 45
End: 2020-12-07
Attending: EMERGENCY MEDICINE
Payer: COMMERCIAL

## 2020-12-07 ENCOUNTER — TELEPHONE (OUTPATIENT)
Dept: PHYSICAL THERAPY | Facility: HOSPITAL | Age: 45
End: 2020-12-07

## 2020-12-07 ENCOUNTER — TELEPHONE (OUTPATIENT)
Dept: INTERNAL MEDICINE CLINIC | Facility: CLINIC | Age: 45
End: 2020-12-07

## 2020-12-07 NOTE — TELEPHONE ENCOUNTER
PA submitted for Swapna. Attila Nguyen (Resendiz: [id number])    Your information has been submitted to [employer]. [employer] is reviewing the PA request and you will receive an electronic response.  You may check for the updated outcome later by

## 2020-12-09 ENCOUNTER — OFFICE VISIT (OUTPATIENT)
Dept: PHYSICAL THERAPY | Age: 45
End: 2020-12-09
Attending: EMERGENCY MEDICINE
Payer: COMMERCIAL

## 2020-12-09 DIAGNOSIS — M54.50 ACUTE RIGHT-SIDED LOW BACK PAIN WITHOUT SCIATICA: ICD-10-CM

## 2020-12-09 PROCEDURE — 97110 THERAPEUTIC EXERCISES: CPT

## 2020-12-09 PROCEDURE — 97161 PT EVAL LOW COMPLEX 20 MIN: CPT

## 2020-12-09 NOTE — PROGRESS NOTES
INITIAL EVALUATION:   Referring Physician: Dr. Meagan Hernandez  Diagnosis: Acute right-sided low back pain without sciatica (M54.5) Date of Service: 12/9/2020     PATIENT SUMMARY   Katia Tamez is a 54year old female who presents to therapy today physical therapy evaluation with primary c/o LBP w/ L LE pain. Pt presents with extension bias, pain with flexion, weakness in hip musculature, and positive neural tension consistent with diagnosis of discogenic low back pain with radicular symptoms.  Pt wi recommendations for possible soreness after evaluation, postural corrections and importance of remaining active  Patient was instructed in and issued a HEP for:   Access Code: [email or web address]   Exercises  Prone Press Up on Elbows - 10 reps - 3x daily - 7x weekly and while under my care.     X___________________________________________________ Date____________________    Certification From: 46/9/8139  To:3/9/2021

## 2020-12-14 ENCOUNTER — OFFICE VISIT (OUTPATIENT)
Dept: PHYSICAL THERAPY | Age: 45
End: 2020-12-14
Attending: EMERGENCY MEDICINE
Payer: COMMERCIAL

## 2020-12-14 PROCEDURE — 97110 THERAPEUTIC EXERCISES: CPT

## 2020-12-14 PROCEDURE — 97112 NEUROMUSCULAR REEDUCATION: CPT

## 2020-12-14 NOTE — TELEPHONE ENCOUNTER
Pt called back and told me she had a fax number she'd like the mail order script faxed to. States she does not know the name of the pharmacy. Advised pt we cannot fax blind and she gave me a phone # to [facility].   She states the med request has to be for 90 w

## 2020-12-14 NOTE — PROGRESS NOTES
Dx: Acute right-sided low back pain without sciatica (M54.5)         Insurance (Authorized # of Visits):  Mai Adam Physician: Dr. Milagro Rodriguez ref.  provider found  Next MD visit: none scheduled  Fall Risk: standard         Precautions: n/a Access Code: [email or web address]   Exercises  Prone Press Up - 10 reps - 3x daily - 7x weekly  Supine Sciatic Nerve Glide - 20 reps - 3x daily - 7x weekly  Supine Bridge - 10 reps - 10 sec hold - 1x daily - 7x weekly  Supine Transversus Abdominis Bracing with Heel

## 2020-12-14 NOTE — TELEPHONE ENCOUNTER
Patient called back, she does not use mail order she uses;  [id number]- [address], [address] [facility] [phone], [phone]

## 2020-12-14 NOTE — TELEPHONE ENCOUNTER
Called and left message for pt again, that I understand she doesn't use mail order however, her insurance will not cover Ozempc at a local pharmacy and she needs to contact her prescription coverage and find out who her mail order co is and let us know so

## 2020-12-16 ENCOUNTER — TELEPHONE (OUTPATIENT)
Dept: PHYSICAL THERAPY | Facility: HOSPITAL | Age: 45
End: 2020-12-16

## 2020-12-16 ENCOUNTER — APPOINTMENT (OUTPATIENT)
Dept: PHYSICAL THERAPY | Age: 45
End: 2020-12-16
Attending: EMERGENCY MEDICINE
Payer: COMMERCIAL

## 2020-12-18 ENCOUNTER — TELEPHONE (OUTPATIENT)
Dept: UROLOGY | Facility: HOSPITAL | Age: 45
End: 2020-12-18

## 2020-12-18 ENCOUNTER — IMMUNIZATION (OUTPATIENT)
Dept: LAB | Facility: HOSPITAL | Age: 45
End: 2020-12-18
Attending: PREVENTIVE MEDICINE
Payer: COMMERCIAL

## 2020-12-18 DIAGNOSIS — Z23 NEED FOR VACCINATION: ICD-10-CM

## 2020-12-18 PROCEDURE — 0001A PFIZER-BIONTECH COVID-19 VACCINE: CPT

## 2020-12-18 RX ORDER — FLUCONAZOLE 150 MG/1
150 TABLET ORAL
Qty: 2 TABLET | Refills: 0 | Status: SHIPPED | OUTPATIENT
Start: 2020-12-18 | End: 2021-02-22 | Stop reason: ALTCHOICE

## 2020-12-18 NOTE — TELEPHONE ENCOUNTER
Pt calling w/ urethral itching. Not sure if she is starting a yeast infection. Pt recently started on abx from dermatologist. Discussed probiotic use w/ abx use can help prevent yeast infections. Pt can also use aquaphor to urethra.  If no improvement by ne

## 2020-12-21 ENCOUNTER — OFFICE VISIT (OUTPATIENT)
Dept: PHYSICAL THERAPY | Age: 45
End: 2020-12-21
Attending: EMERGENCY MEDICINE
Payer: COMMERCIAL

## 2020-12-21 PROCEDURE — 97110 THERAPEUTIC EXERCISES: CPT

## 2020-12-21 NOTE — PROGRESS NOTES
Dx: Acute right-sided low back pain without sciatica (M54.5)         Insurance (Authorized # of Visits):  Padmini Beasley Physician: Dr. Danika Greene ref.  provider found  Next MD visit: none scheduled  Fall Risk: standard         Precautions: n/a NMREED:  Supine TvA education x 5'  Supine TvA w/ heelslide and BP cuff feedback x 10 NMREED:  Supine TvA Heelslide review x 10 ea, attempt 90/90 march but unable  Supine SB Abdominal Contraction 10 x 10\"H      HEP:   Access Code: [email or web address]   Exercises

## 2020-12-23 ENCOUNTER — APPOINTMENT (OUTPATIENT)
Dept: PHYSICAL THERAPY | Age: 45
End: 2020-12-23
Attending: EMERGENCY MEDICINE
Payer: COMMERCIAL

## 2020-12-23 ENCOUNTER — TELEPHONE (OUTPATIENT)
Dept: PHYSICAL THERAPY | Facility: HOSPITAL | Age: 45
End: 2020-12-23

## 2020-12-28 ENCOUNTER — OFFICE VISIT (OUTPATIENT)
Dept: PHYSICAL THERAPY | Age: 45
End: 2020-12-28
Attending: EMERGENCY MEDICINE
Payer: COMMERCIAL

## 2020-12-28 PROCEDURE — 97110 THERAPEUTIC EXERCISES: CPT

## 2020-12-28 PROCEDURE — 97140 MANUAL THERAPY 1/> REGIONS: CPT

## 2020-12-28 NOTE — PROGRESS NOTES
Dx: Acute right-sided low back pain without sciatica (M54.5)         Insurance (Authorized # of Visits):  Candie Pereira Physician: Dr. Harper Dale ref.  provider found  Next MD visit: none scheduled  Fall Risk: standard         Precautions: n/a THEREX:  Nustep x 5'  SB DKTC 5\"H x 10 ea  S/L Open Book 10 x 10\"H  Seated on SB Oblique Twist 2.5# x 10 ea  Sidestep/MW x 1L RTB  SB 3 Way Flexion Stretch x 3' total     NMREED:  Supine TvA education x 5'  Supine TvA w/ heelslide and BP cuff feedback x

## 2020-12-30 ENCOUNTER — OFFICE VISIT (OUTPATIENT)
Dept: PHYSICAL THERAPY | Age: 45
End: 2020-12-30
Attending: EMERGENCY MEDICINE
Payer: COMMERCIAL

## 2020-12-30 PROCEDURE — 97112 NEUROMUSCULAR REEDUCATION: CPT

## 2020-12-30 PROCEDURE — 97110 THERAPEUTIC EXERCISES: CPT

## 2020-12-30 NOTE — PROGRESS NOTES
Dx: Acute right-sided low back pain without sciatica (M54.5)         Insurance (Authorized # of Visits):  Guille Nguyen Physician: Dr. Winford Osler ref.  provider found  Next MD visit: none scheduled  Fall Risk: standard         Precautions: n/a x 10 ea  S/L Open Book 10 x 10\"H  Seated on SB Oblique Twist 2.5# x 10 ea  Sidestep/MW x 1L RTB  SB 3 Way Flexion Stretch x 3' total THEREX:  Nustep x 5'  Oblique Press Out 7.5#  10 x3\"H  Prone LE Ext x 10, Opp UE/LE x 10 on SB  SS/MW x 1L ea RTB  RDL Ed

## 2021-01-05 DIAGNOSIS — Z51.81 THERAPEUTIC DRUG MONITORING: ICD-10-CM

## 2021-01-05 NOTE — TELEPHONE ENCOUNTER
Pt asking for script for Ozempic be sent to [facility] [phone]. Pt states she has been out of ozempic for 2 weeks.   Thank you,

## 2021-01-07 RX ORDER — SEMAGLUTIDE 1.34 MG/ML
1 INJECTION, SOLUTION SUBCUTANEOUS WEEKLY
Qty: 6 PEN | Refills: 0 | Status: SHIPPED | OUTPATIENT
Start: 2021-01-07 | End: 2021-08-19

## 2021-01-07 NOTE — TELEPHONE ENCOUNTER
Sent to mail order, just have her do 0.7WJ (half of the clicks) to 1mg for 2 weeks and then she can increase to the 1mg (if nausea is ok)

## 2021-01-08 ENCOUNTER — IMMUNIZATION (OUTPATIENT)
Dept: LAB | Facility: HOSPITAL | Age: 46
End: 2021-01-08
Attending: PREVENTIVE MEDICINE
Payer: COMMERCIAL

## 2021-01-08 DIAGNOSIS — Z23 NEED FOR VACCINATION: ICD-10-CM

## 2021-01-08 PROCEDURE — 0002A SARSCOV2 VAC 30MCG/0.3ML IM: CPT

## 2021-01-11 ENCOUNTER — OFFICE VISIT (OUTPATIENT)
Dept: PHYSICAL THERAPY | Age: 46
End: 2021-01-11
Attending: EMERGENCY MEDICINE
Payer: COMMERCIAL

## 2021-01-11 PROCEDURE — 97110 THERAPEUTIC EXERCISES: CPT

## 2021-01-11 NOTE — PROGRESS NOTES
Dx: Acute right-sided low back pain without sciatica (M54.5)         Insurance (Authorized # of Visits):  Teodoro Calvo Physician: Dr. Michael Evans ref.  provider found  Next MD visit: none scheduled  Fall Risk: standard         Precautions: n/a Education THEREX:  Nustep x 5'  Frog bridge 10 x 5\"H GTB  Sciatic nerve floss 30\" x 3  Standing Piriformis stretch 30\" x3  S/L Clams 3 x 20  Attempt prone ext and bird dog THEREX:  Nustep x 5'  SB DKTC 5\"H x 10 ea  S/L Open Book 10 x 10\"H  Seated on S

## 2021-01-17 DIAGNOSIS — E11.59 HYPERTENSION ASSOCIATED WITH DIABETES (HCC): ICD-10-CM

## 2021-01-17 DIAGNOSIS — I15.2 HYPERTENSION ASSOCIATED WITH DIABETES (HCC): ICD-10-CM

## 2021-01-18 ENCOUNTER — TELEPHONE (OUTPATIENT)
Dept: PHYSICAL THERAPY | Facility: HOSPITAL | Age: 46
End: 2021-01-18

## 2021-01-18 ENCOUNTER — APPOINTMENT (OUTPATIENT)
Dept: PHYSICAL THERAPY | Age: 46
End: 2021-01-18
Attending: EMERGENCY MEDICINE
Payer: COMMERCIAL

## 2021-01-18 RX ORDER — LOSARTAN POTASSIUM 50 MG/1
TABLET ORAL
Qty: 90 TABLET | Refills: 0 | Status: SHIPPED | OUTPATIENT
Start: 2021-01-18 | End: 2021-08-16

## 2021-01-25 ENCOUNTER — TELEPHONE (OUTPATIENT)
Dept: PHYSICAL THERAPY | Age: 46
End: 2021-01-25

## 2021-01-25 ENCOUNTER — APPOINTMENT (OUTPATIENT)
Dept: PHYSICAL THERAPY | Age: 46
End: 2021-01-25
Attending: EMERGENCY MEDICINE
Payer: COMMERCIAL

## 2021-01-28 ENCOUNTER — TELEPHONE (OUTPATIENT)
Dept: PHYSICAL THERAPY | Facility: HOSPITAL | Age: 46
End: 2021-01-28

## 2021-02-01 ENCOUNTER — TELEPHONE (OUTPATIENT)
Dept: PHYSICAL THERAPY | Facility: HOSPITAL | Age: 46
End: 2021-02-01

## 2021-02-08 ENCOUNTER — TELEPHONE (OUTPATIENT)
Dept: PHYSICAL THERAPY | Facility: HOSPITAL | Age: 46
End: 2021-02-08

## 2021-02-08 ENCOUNTER — APPOINTMENT (OUTPATIENT)
Dept: PHYSICAL THERAPY | Age: 46
End: 2021-02-08
Attending: EMERGENCY MEDICINE
Payer: COMMERCIAL

## 2021-02-09 ENCOUNTER — TELEPHONE (OUTPATIENT)
Dept: ENDOCRINOLOGY CLINIC | Facility: CLINIC | Age: 46
End: 2021-02-09

## 2021-02-09 NOTE — TELEPHONE ENCOUNTER
Pt came in and thought her appt was 2/9 at 4. She showed me her phone and it had the 9th on it. She said she made it in the room . Wanting to know if she can do a video visit on Friday later in day. She also needs her meds increased. Please call her.

## 2021-02-11 NOTE — TELEPHONE ENCOUNTER
Not sure what happened, she was arcadio. On the feb. 8th @ 4pm and no showed. .. and I think she did it on 1/4/21 as well. She hasn't been seen in the office since 2/2020.   I told her with the last telemedicine visit in 12/2020- that next appt needed to be in

## 2021-02-15 ENCOUNTER — TELEPHONE (OUTPATIENT)
Dept: FAMILY MEDICINE CLINIC | Facility: CLINIC | Age: 46
End: 2021-02-15

## 2021-02-15 NOTE — TELEPHONE ENCOUNTER
Pt requesting complete labs because she has been getting lightheaded which she thinks might be from headaches or medication. Pt also states for the last 3 months her stools have had a different odor.  Pt scheduled an appt for 2/22 to go over labs and discus

## 2021-02-22 ENCOUNTER — OFFICE VISIT (OUTPATIENT)
Dept: FAMILY MEDICINE CLINIC | Facility: CLINIC | Age: 46
End: 2021-02-22
Payer: COMMERCIAL

## 2021-02-22 VITALS
OXYGEN SATURATION: 100 % | HEART RATE: 79 BPM | TEMPERATURE: 97 F | BODY MASS INDEX: 42 KG/M2 | SYSTOLIC BLOOD PRESSURE: 126 MMHG | WEIGHT: 232 LBS | DIASTOLIC BLOOD PRESSURE: 84 MMHG | RESPIRATION RATE: 15 BRPM

## 2021-02-22 DIAGNOSIS — R06.83 SNORING: ICD-10-CM

## 2021-02-22 DIAGNOSIS — Z80.3 FAMILY HISTORY OF BREAST CANCER: ICD-10-CM

## 2021-02-22 DIAGNOSIS — Z11.3 SCREENING FOR STDS (SEXUALLY TRANSMITTED DISEASES): ICD-10-CM

## 2021-02-22 DIAGNOSIS — R42 DIZZINESSES: Primary | ICD-10-CM

## 2021-02-22 DIAGNOSIS — R19.7 DIARRHEA, UNSPECIFIED TYPE: ICD-10-CM

## 2021-02-22 DIAGNOSIS — Z86.39 HISTORY OF HYPOTHYROIDISM: ICD-10-CM

## 2021-02-22 PROCEDURE — 3079F DIAST BP 80-89 MM HG: CPT | Performed by: EMERGENCY MEDICINE

## 2021-02-22 PROCEDURE — 93000 ELECTROCARDIOGRAM COMPLETE: CPT | Performed by: EMERGENCY MEDICINE

## 2021-02-22 PROCEDURE — 99214 OFFICE O/P EST MOD 30 MIN: CPT | Performed by: EMERGENCY MEDICINE

## 2021-02-22 PROCEDURE — 3074F SYST BP LT 130 MM HG: CPT | Performed by: EMERGENCY MEDICINE

## 2021-02-22 RX ORDER — MECLIZINE HYDROCHLORIDE 25 MG/1
25 TABLET ORAL 3 TIMES DAILY PRN
Qty: 30 TABLET | Refills: 1 | Status: SHIPPED | OUTPATIENT
Start: 2021-02-22 | End: 2021-08-19 | Stop reason: ALTCHOICE

## 2021-02-23 NOTE — PATIENT INSTRUCTIONS
Thank you for choosing [address] [place] [address] Group  To Do:  FOR EFREN ESTRELLA        1. Arrange for mammogram  2. Arrange for sleep study  3. Have blood tests done  4. Have stool tests done  5. EKG today  6.  Take Meclizine for dizziness, as needed yet.  Driving  If you become dizzy or disoriented while driving, you could hurt yourself and others. That's why it's best to not drive until symptoms have gone away.  In some cases, your license may be temporarily held until it's safe for you to drive again the prescribing healthcare provider. Your medicine plan may need adjustment. · If dizziness lasts more than a few seconds, sit or lie down until it passes. This may help prevent injury in case you pass out. Get up slowly when you feel better.   · Don't dri ear. Problems include changes in inner ear structures, infection, swelling, or excess fluid. Sometimes vertigo is due to a brain problem, such as migraine, stroke, or tumor.    Dysequilibrium  Dysequilibrium is the feeling of imbalance without a sense of sp

## 2021-02-23 NOTE — PROGRESS NOTES
Chief Complaint:   Patient presents with:  Headache: Dizzy spells   STD: Requesting labs     HPI:   This is a 51year old female     DIZZINESS  Ongoing for the past 2 week. + associated mild HA  Head movements make it worse.   Sx better at bedtime  No Pal Subcutaneous Solution Pen-injector, Inject 1 mg into the skin once a week., Disp: 6 pen, Rfl: 0    •  Phentermine HCl 37.5 MG Oral Tab, Take 1 tablet (37.5 mg total) by mouth every morning before breakfast., Disp: 30 tablet, Rfl: 0    •  metFORMIN HCl 1000 SYSTEMS:   Review of systems significant for dizziness  The rest of the review of systems is negative except those stated as above    PHYSICAL EXAM:   /84   Pulse 79   Temp 97.4 °F (36.3 °C) (Temporal)   Resp 15   Wt 232 lb (105.2 kg)   SpO2 100%   B sleep study  3. Have blood tests done  4. Have stool tests done  5. EKG today  6. Take Meclizine for dizziness, as needed  7.  Follow up if with persistent Sx.

## 2021-02-26 ENCOUNTER — LAB ENCOUNTER (OUTPATIENT)
Dept: LAB | Facility: HOSPITAL | Age: 46
End: 2021-02-26
Attending: EMERGENCY MEDICINE
Payer: COMMERCIAL

## 2021-02-26 DIAGNOSIS — R19.7 DIARRHEA, UNSPECIFIED TYPE: ICD-10-CM

## 2021-02-26 LAB
CRYPTOSP AG STL QL IA: NEGATIVE
G LAMBLIA AG STL QL IA: NEGATIVE

## 2021-02-26 PROCEDURE — 89055 LEUKOCYTE ASSESSMENT FECAL: CPT

## 2021-02-26 PROCEDURE — 87045 FECES CULTURE AEROBIC BACT: CPT

## 2021-02-26 PROCEDURE — 87493 C DIFF AMPLIFIED PROBE: CPT

## 2021-02-26 PROCEDURE — 87427 SHIGA-LIKE TOXIN AG IA: CPT

## 2021-02-26 PROCEDURE — 87329 GIARDIA AG IA: CPT

## 2021-02-26 PROCEDURE — 87046 STOOL CULTR AEROBIC BACT EA: CPT

## 2021-02-26 PROCEDURE — 87272 CRYPTOSPORIDIUM AG IF: CPT

## 2021-02-27 ENCOUNTER — LAB ENCOUNTER (OUTPATIENT)
Dept: LAB | Age: 46
End: 2021-02-27
Attending: EMERGENCY MEDICINE
Payer: COMMERCIAL

## 2021-02-27 DIAGNOSIS — R42 DIZZINESSES: ICD-10-CM

## 2021-02-27 DIAGNOSIS — E11.9 CONTROLLED TYPE 2 DIABETES MELLITUS WITHOUT COMPLICATION, WITHOUT LONG-TERM CURRENT USE OF INSULIN (HCC): ICD-10-CM

## 2021-02-27 DIAGNOSIS — E78.5 HYPERLIPIDEMIA, UNSPECIFIED HYPERLIPIDEMIA TYPE: ICD-10-CM

## 2021-02-27 DIAGNOSIS — Z86.39 HISTORY OF HYPOTHYROIDISM: ICD-10-CM

## 2021-02-27 LAB
ANION GAP SERPL CALC-SCNC: 7 MMOL/L (ref 0–18)
BASOPHILS # BLD AUTO: 0.04 X10(3) UL (ref 0–0.2)
BASOPHILS NFR BLD AUTO: 0.5 %
BUN BLD-MCNC: 15 MG/DL (ref 7–18)
BUN/CREAT SERPL: 14.9 (ref 10–20)
C DIFF TOX B STL QL: NEGATIVE
CALCIUM BLD-MCNC: 9 MG/DL (ref 8.5–10.1)
CHLORIDE SERPL-SCNC: 108 MMOL/L (ref 98–112)
CHOLEST SMN-MCNC: 158 MG/DL (ref ?–200)
CO2 SERPL-SCNC: 23 MMOL/L (ref 21–32)
CREAT BLD-MCNC: 1.01 MG/DL
DEPRECATED RDW RBC AUTO: 45.6 FL (ref 35.1–46.3)
EOSINOPHIL # BLD AUTO: 0.16 X10(3) UL (ref 0–0.7)
EOSINOPHIL NFR BLD AUTO: 1.9 %
ERYTHROCYTE [DISTWIDTH] IN BLOOD BY AUTOMATED COUNT: 13.2 % (ref 11–15)
GLUCOSE BLD-MCNC: 90 MG/DL (ref 70–99)
HCT VFR BLD AUTO: 41.5 %
HDLC SERPL-MCNC: 63 MG/DL (ref 40–59)
HGB BLD-MCNC: 12.9 G/DL
IMM GRANULOCYTES # BLD AUTO: 0.03 X10(3) UL (ref 0–1)
IMM GRANULOCYTES NFR BLD: 0.4 %
LDLC SERPL CALC-MCNC: 80 MG/DL (ref ?–100)
LYMPHOCYTES # BLD AUTO: 2.05 X10(3) UL (ref 1–4)
LYMPHOCYTES NFR BLD AUTO: 24.6 %
MCH RBC QN AUTO: 29.6 PG (ref 26–34)
MCHC RBC AUTO-ENTMCNC: 31.1 G/DL (ref 31–37)
MCV RBC AUTO: 95.2 FL
MONOCYTES # BLD AUTO: 0.62 X10(3) UL (ref 0.1–1)
MONOCYTES NFR BLD AUTO: 7.4 %
NEUTROPHILS # BLD AUTO: 5.44 X10 (3) UL (ref 1.5–7.7)
NEUTROPHILS # BLD AUTO: 5.44 X10(3) UL (ref 1.5–7.7)
NEUTROPHILS NFR BLD AUTO: 65.2 %
NONHDLC SERPL-MCNC: 95 MG/DL (ref ?–130)
OSMOLALITY SERPL CALC.SUM OF ELEC: 286 MOSM/KG (ref 275–295)
PATIENT FASTING Y/N/NP: YES
PATIENT FASTING Y/N/NP: YES
PLATELET # BLD AUTO: 264 10(3)UL (ref 150–450)
POTASSIUM SERPL-SCNC: 4.3 MMOL/L (ref 3.5–5.1)
RBC # BLD AUTO: 4.36 X10(6)UL
SODIUM SERPL-SCNC: 138 MMOL/L (ref 136–145)
T4 FREE SERPL-MCNC: 0.8 NG/DL (ref 0.8–1.7)
TRIGL SERPL-MCNC: 77 MG/DL (ref 30–149)
TSI SER-ACNC: 6.18 MIU/ML (ref 0.36–3.74)
VLDLC SERPL CALC-MCNC: 15 MG/DL (ref 0–30)
WBC # BLD AUTO: 8.3 X10(3) UL (ref 4–11)

## 2021-02-27 PROCEDURE — 36415 COLL VENOUS BLD VENIPUNCTURE: CPT

## 2021-02-27 PROCEDURE — 84439 ASSAY OF FREE THYROXINE: CPT

## 2021-02-27 PROCEDURE — 80048 BASIC METABOLIC PNL TOTAL CA: CPT

## 2021-02-27 PROCEDURE — 84443 ASSAY THYROID STIM HORMONE: CPT

## 2021-02-27 PROCEDURE — 85025 COMPLETE CBC W/AUTO DIFF WBC: CPT

## 2021-02-27 PROCEDURE — 80061 LIPID PANEL: CPT

## 2021-02-28 NOTE — PROGRESS NOTES
HISTORY OF PRESENT ILLNESS  Patient presents with:  Weight Check: 6 pound weight gain    Alonso Jordan is a 51year old female here for follow up with medical weight loss program for the treatment of overweight, obesity, or morbid obesity. developed, well nourished, in no apparent distress, A/O x3  SKIN: no rashes, no suspicious lesions  HEENT: conjunctiva pink, sclera non icteric, PERRLA  NECK: supple, no adenopathy  LUNGS: CTA in all fields, breathing non labored  CARDIO: RRR without murmu 1 mg into the skin once a week., Disp: 6 pen, Rfl: 0    •  Phentermine HCl 37.5 MG Oral Tab, Take 1 tablet (37.5 mg total) by mouth every morning before breakfast., Disp: 30 tablet, Rfl: 0    •  atorvastatin 10 MG Oral Tab, Take 1 tablet (10 mg total) by m medication  · Contradictions: stopped jardance b/c of recurrent yeast, phentermine in the past  · Reviewed labs from PCP  · HTN  blood pressure is in office is stable - continue present management  · HLD  Stable, diet controlled, continue to follow-up with

## 2021-03-01 ENCOUNTER — OFFICE VISIT (OUTPATIENT)
Dept: INTERNAL MEDICINE CLINIC | Facility: CLINIC | Age: 46
End: 2021-03-01
Payer: COMMERCIAL

## 2021-03-01 VITALS
WEIGHT: 235 LBS | BODY MASS INDEX: 43.24 KG/M2 | HEIGHT: 62 IN | DIASTOLIC BLOOD PRESSURE: 70 MMHG | RESPIRATION RATE: 16 BRPM | HEART RATE: 82 BPM | SYSTOLIC BLOOD PRESSURE: 110 MMHG

## 2021-03-01 DIAGNOSIS — E66.01 OBESITY, CLASS III, BMI 40-49.9 (MORBID OBESITY) (HCC): ICD-10-CM

## 2021-03-01 DIAGNOSIS — Z51.81 THERAPEUTIC DRUG MONITORING: Primary | ICD-10-CM

## 2021-03-01 DIAGNOSIS — E78.5 DYSLIPIDEMIA: ICD-10-CM

## 2021-03-01 DIAGNOSIS — E11.59 HYPERTENSION ASSOCIATED WITH DIABETES (HCC): ICD-10-CM

## 2021-03-01 DIAGNOSIS — I15.2 HYPERTENSION ASSOCIATED WITH DIABETES (HCC): ICD-10-CM

## 2021-03-01 DIAGNOSIS — E11.10 TYPE 2 DIABETES MELLITUS WITH KETOACIDOSIS WITHOUT COMA, WITHOUT LONG-TERM CURRENT USE OF INSULIN (HCC): ICD-10-CM

## 2021-03-01 DIAGNOSIS — E03.9 ACQUIRED HYPOTHYROIDISM: ICD-10-CM

## 2021-03-01 PROCEDURE — 3078F DIAST BP <80 MM HG: CPT | Performed by: NURSE PRACTITIONER

## 2021-03-01 PROCEDURE — 99214 OFFICE O/P EST MOD 30 MIN: CPT | Performed by: NURSE PRACTITIONER

## 2021-03-01 PROCEDURE — 3008F BODY MASS INDEX DOCD: CPT | Performed by: NURSE PRACTITIONER

## 2021-03-01 PROCEDURE — 3074F SYST BP LT 130 MM HG: CPT | Performed by: NURSE PRACTITIONER

## 2021-03-01 RX ORDER — NALTREXONE HYDROCHLORIDE AND BUPROPION HYDROCHLORIDE 8; 90 MG/1; MG/1
TABLET, EXTENDED RELEASE ORAL
Qty: 120 TABLET | Refills: 0 | Status: SHIPPED | OUTPATIENT
Start: 2021-03-01 | End: 2021-08-19 | Stop reason: ALTCHOICE

## 2021-03-01 RX ORDER — PHENTERMINE HYDROCHLORIDE 37.5 MG/1
37.5 TABLET ORAL
Qty: 30 TABLET | Refills: 0 | Status: CANCELLED | OUTPATIENT
Start: 2021-03-01

## 2021-03-01 NOTE — PATIENT INSTRUCTIONS
We are here to support you with weight loss, but please remember that you still need your primary care provider for your routine health maintenance.       PLAN:  Start contrave, continue with ozempic, metformin   Follow up with me in 1 month  Schedule oh ** Daily INPUT> Look at nutrition section-- \"nutrients\" and it will break down your macros for the day (ie. Protein, carbs, fibers, sugars and fats). Try to stay within these numbers daily     2.  \"7 minute workout\" to help with exercise/a

## 2021-03-02 ENCOUNTER — TELEPHONE (OUTPATIENT)
Dept: FAMILY MEDICINE CLINIC | Facility: CLINIC | Age: 46
End: 2021-03-02

## 2021-03-02 DIAGNOSIS — R79.89 ELEVATED TSH: Primary | ICD-10-CM

## 2021-03-02 NOTE — TELEPHONE ENCOUNTER
Spoke to patient. She states she takes Biotin 5000mcg daily. Do you want patient to hold for a few days and recheck? Please advise.     Patient forgot to mention at her OV that for about 3 weeks her right arm goes numb easily even when just slightly laying

## 2021-03-02 NOTE — TELEPHONE ENCOUNTER
Jagdish Paul MD  [facility] Clinical Staff             TSH a little high, t4 normal   Ok to monitor   Repeat TSH in 6 months sooner if she becomes symptomatic   LIpids stable continue atorvastatin     Rest of labe stable      Jagdish Paul MD  P E

## 2021-03-03 ENCOUNTER — OFFICE VISIT (OUTPATIENT)
Dept: PHYSICAL THERAPY | Age: 46
End: 2021-03-03
Attending: EMERGENCY MEDICINE
Payer: COMMERCIAL

## 2021-03-03 PROCEDURE — 97110 THERAPEUTIC EXERCISES: CPT

## 2021-03-03 NOTE — PROGRESS NOTES
Progress Summary  Pt has attended 7 visits in Physical Therapy.    Dx: Acute right-sided low back pain without sciatica (M54.5)         Insurance (Authorized # of Visits):  [id number] [address] Physician: Dr. Opal Wolf MD visit: none sched treatment options and has agreed to actively participate in planning and for this course of care. Thank you for your referral. If you have any questions, please contact me at Dept: [phone].     Sincerely,  Electronically signed by therapist: Louise Salazar NMREED:  Supine TvA education x 5'  Supine TvA w/ heelslide and BP cuff feedback x 10 NMREED:  Supine TvA Heelslide review x 10 ea, attempt 90/90 march but unable  Supine SB Abdominal Contraction 10 x 10\"H NMREED:  Supine TvA Heelslide review   Supine S

## 2021-03-05 NOTE — TELEPHONE ENCOUNTER
Called pt and informed her of Dr. Kiara Xiong response. TSH ordered. Pt states has stopped taking Biotin since looking at results. All questions answered and pt verbalized understanding.

## 2021-03-05 NOTE — TELEPHONE ENCOUNTER
Ok to hold Biotin supplement x 1 week prior to blood draw      May be carpal tunnel syndrome  Have patient use over the counter wrist splints at night to keep wrists in neutral position   Try to keep wrist neutral as much as possible  Follow up with OV for

## 2021-03-11 ENCOUNTER — TELEPHONE (OUTPATIENT)
Dept: INTERNAL MEDICINE CLINIC | Facility: CLINIC | Age: 46
End: 2021-03-11

## 2021-03-11 NOTE — TELEPHONE ENCOUNTER
Pt called left vmail states ins will only cover a 90 day supply of metformin , pt would like rx to go to [facility]   [id number]- [address], [address] [facility] [place] [phone], [phone]

## 2021-03-24 ENCOUNTER — LABORATORY ENCOUNTER (OUTPATIENT)
Dept: LAB | Age: 46
End: 2021-03-24
Attending: EMERGENCY MEDICINE
Payer: COMMERCIAL

## 2021-03-24 DIAGNOSIS — R79.89 ELEVATED TSH: ICD-10-CM

## 2021-03-24 LAB — TSI SER-ACNC: 4.14 MIU/ML (ref 0.36–3.74)

## 2021-03-24 PROCEDURE — 84443 ASSAY THYROID STIM HORMONE: CPT

## 2021-03-24 PROCEDURE — 36415 COLL VENOUS BLD VENIPUNCTURE: CPT

## 2021-03-30 ENCOUNTER — TELEPHONE (OUTPATIENT)
Dept: FAMILY MEDICINE CLINIC | Facility: CLINIC | Age: 46
End: 2021-03-30

## 2021-03-30 DIAGNOSIS — R79.89 ABNORMAL TSH: Primary | ICD-10-CM

## 2021-03-30 NOTE — TELEPHONE ENCOUNTER
Pt wants to know if she should go back to taking her Synthroid after lab results? Pt also wants to know if she can go back to taking her biotin vitamins?

## 2021-03-31 NOTE — TELEPHONE ENCOUNTER
Patient wondering if she should start Synthroid again. Patient wondering if she can take vitamin with biotin in it. Please advise. Thank you.

## 2021-03-31 NOTE — TELEPHONE ENCOUNTER
----- Message from Tanisha Chen MD sent at 3/30/2021  5:26 PM CDT -----  TSH stable  Pt most likely with subclinical hypothyroidism  Repeat TSH in 6 months

## 2021-04-01 NOTE — TELEPHONE ENCOUNTER
NO need to start levothyroxine at this time  Repeat TSH with reflex to T4 in 6 months sooner if symptomatic  Ok to take OTV vitamin with biotin, however, Patient needs to stop the biotin supplement a few days before blood draw

## 2021-04-01 NOTE — TELEPHONE ENCOUNTER
Spoke to patient and reviewed POC. Discussed signs of hypothyroidism. Verbalized understanding of all instructions. Lab order placed.

## 2021-04-03 ENCOUNTER — HOSPITAL ENCOUNTER (OUTPATIENT)
Dept: MAMMOGRAPHY | Age: 46
Discharge: HOME | End: 2021-04-03
Attending: EMERGENCY MEDICINE
Payer: COMMERCIAL

## 2021-04-03 DIAGNOSIS — Z12.31 ENCOUNTER FOR SCREENING MAMMOGRAM FOR BREAST CANCER: ICD-10-CM

## 2021-04-03 PROCEDURE — 77063 BREAST TOMOSYNTHESIS BI: CPT | Performed by: EMERGENCY MEDICINE

## 2021-04-03 PROCEDURE — 77067 SCR MAMMO BI INCL CAD: CPT | Performed by: EMERGENCY MEDICINE

## 2021-05-26 ENCOUNTER — TELEPHONE (OUTPATIENT)
Dept: FAMILY MEDICINE CLINIC | Facility: CLINIC | Age: 46
End: 2021-05-26

## 2021-05-26 DIAGNOSIS — E03.9 HYPOTHYROIDISM, UNSPECIFIED TYPE: Primary | ICD-10-CM

## 2021-05-26 NOTE — TELEPHONE ENCOUNTER
Pt had labs that showed elevated thyroid levels, states Dr Annamaria Ferraro told her to call if she had any changes. States she is now fatigue and weight is going up.

## 2021-05-26 NOTE — TELEPHONE ENCOUNTER
See TE 3/30. OK to order TSH w/ reflex is symptomatic. Spoke to pt. Advised her to have TSH level drawn and that we'll call w/ results. Verbalized understanding. Order placed.

## 2021-05-26 NOTE — TELEPHONE ENCOUNTER
FYI-I ordered TSH w/ reflex for pt. Per TE 3/30 pt was to recheck TSH in 6 months (borderline hypothyroid) or sooner if symptomatic. Pt now reporting symptoms.

## 2021-05-27 ENCOUNTER — LAB ENCOUNTER (OUTPATIENT)
Dept: LAB | Age: 46
End: 2021-05-27
Attending: EMERGENCY MEDICINE
Payer: COMMERCIAL

## 2021-05-27 DIAGNOSIS — E03.9 HYPOTHYROIDISM, UNSPECIFIED TYPE: ICD-10-CM

## 2021-05-27 PROCEDURE — 84443 ASSAY THYROID STIM HORMONE: CPT

## 2021-05-27 PROCEDURE — 36415 COLL VENOUS BLD VENIPUNCTURE: CPT

## 2021-06-15 ENCOUNTER — TELEPHONE (OUTPATIENT)
Dept: FAMILY MEDICINE CLINIC | Facility: CLINIC | Age: 46
End: 2021-06-15

## 2021-06-15 NOTE — TELEPHONE ENCOUNTER
Called pt and informed her an Hemoglobin A1C is not ordered and not sure why she received notification for this to be drawn. Pt then stated she is having dizziness and headaches. VV appt scheduled for 6/17 at 1600.  Any lab orders you would like prior to he

## 2021-06-15 NOTE — TELEPHONE ENCOUNTER
Patient called the back line and asked to speak to a nurse informed her the nurse is unavailable. She asked if I could take a message. She stated she received a message from [employer] stating to schedule an appt for her A1C.  She said when she tried to schedu

## 2021-06-17 ENCOUNTER — VIRTUAL PHONE E/M (OUTPATIENT)
Dept: FAMILY MEDICINE CLINIC | Facility: CLINIC | Age: 46
End: 2021-06-17
Payer: COMMERCIAL

## 2021-06-17 ENCOUNTER — HOSPITAL ENCOUNTER (OUTPATIENT)
Dept: GENERAL RADIOLOGY | Facility: HOSPITAL | Age: 46
Discharge: HOME | End: 2021-06-17
Attending: EMERGENCY MEDICINE
Payer: COMMERCIAL

## 2021-06-17 ENCOUNTER — PATIENT MESSAGE (OUTPATIENT)
Dept: FAMILY MEDICINE CLINIC | Facility: CLINIC | Age: 46
End: 2021-06-17

## 2021-06-17 ENCOUNTER — EKG ENCOUNTER (OUTPATIENT)
Dept: LAB | Facility: HOSPITAL | Age: 46
End: 2021-06-17
Attending: EMERGENCY MEDICINE
Payer: COMMERCIAL

## 2021-06-17 DIAGNOSIS — R00.2 PALPITATIONS: Primary | ICD-10-CM

## 2021-06-17 DIAGNOSIS — R00.2 PALPITATIONS: ICD-10-CM

## 2021-06-17 DIAGNOSIS — R55 SYNCOPE, NEAR: ICD-10-CM

## 2021-06-17 PROCEDURE — 71046 X-RAY EXAM CHEST 2 VIEWS: CPT | Performed by: EMERGENCY MEDICINE

## 2021-06-17 PROCEDURE — 99214 OFFICE O/P EST MOD 30 MIN: CPT | Performed by: EMERGENCY MEDICINE

## 2021-06-17 PROCEDURE — 93005 ELECTROCARDIOGRAM TRACING: CPT

## 2021-06-17 PROCEDURE — 93010 ELECTROCARDIOGRAM REPORT: CPT | Performed by: INTERNAL MEDICINE

## 2021-06-17 NOTE — PROGRESS NOTES
This is a telemedicine visit with live, interactive  audio. Dusty Ann verbally consents to a Virtual/Telephone Check-In visit on 06/17/21.     Patient understands and accepts financial responsibility for any deductible, co-insurance a in AM and PM. 120 tablet 0   • Meclizine HCl 25 MG Oral Tab Take 1 tablet (25 mg total) by mouth 3 (three) times daily as needed for Dizziness or Nausea.  30 tablet 1   • Minocycline HCl 100 MG Oral Cap Take 1 capsule (100 mg total) by mouth 2 (two) times d FREE T4; Future              Please note that the following visit was completed using two-way, real-time interactive audio and video communication.   This has been done in [place] to provide continuity of care in the best interest of the provider-patient

## 2021-06-17 NOTE — PATIENT INSTRUCTIONS
2 or 3 weeks okay to follow-up with me after all testing is done okay thank you for choosing [address] [place] [address] Group  To Do:  FOR EFREN ESTRELLA        1.    She does get faxed in the numbers may get lost in the scanning phase I will let me see if

## 2021-06-18 NOTE — TELEPHONE ENCOUNTER
From: Aimee Moise  To: Meng Clayton MD  Sent: 6/17/2021 5:35 PM CDT  Subject: Other    Hi Dr Luis Rouse, just wanted to let you know the tyshawn est they can get me in at any of our locations for the [place] monitor is 7/15/32 is that

## 2021-06-18 NOTE — TELEPHONE ENCOUNTER
First available appt at any location for Echo & Holter monitor is 7/15. Pt is asking if it is OK to wait until 7/15.

## 2021-06-21 ENCOUNTER — LAB ENCOUNTER (OUTPATIENT)
Dept: LAB | Age: 46
End: 2021-06-21
Attending: EMERGENCY MEDICINE
Payer: COMMERCIAL

## 2021-06-21 DIAGNOSIS — R00.2 PALPITATIONS: ICD-10-CM

## 2021-06-21 DIAGNOSIS — R55 SYNCOPE, NEAR: ICD-10-CM

## 2021-06-21 PROCEDURE — 36415 COLL VENOUS BLD VENIPUNCTURE: CPT

## 2021-06-21 PROCEDURE — 84443 ASSAY THYROID STIM HORMONE: CPT

## 2021-06-21 PROCEDURE — 85025 COMPLETE CBC W/AUTO DIFF WBC: CPT

## 2021-06-21 PROCEDURE — 80053 COMPREHEN METABOLIC PANEL: CPT

## 2021-06-22 PROBLEM — R10.13 EPIGASTRIC PAIN: Status: ACTIVE | Noted: 2021-06-22

## 2021-06-22 PROBLEM — R19.7 DIARRHEA: Status: ACTIVE | Noted: 2021-06-22

## 2021-06-22 PROBLEM — K21.9 GASTROESOPHAGEAL REFLUX DISEASE: Status: ACTIVE | Noted: 2021-06-22

## 2021-06-24 ENCOUNTER — HOSPITAL ENCOUNTER (OUTPATIENT)
Dept: CV DIAGNOSTICS | Facility: HOSPITAL | Age: 46
Discharge: HOME | End: 2021-06-24
Attending: EMERGENCY MEDICINE
Payer: COMMERCIAL

## 2021-06-24 DIAGNOSIS — R00.2 PALPITATIONS: ICD-10-CM

## 2021-06-24 DIAGNOSIS — R55 SYNCOPE, NEAR: ICD-10-CM

## 2021-06-24 PROCEDURE — 93306 TTE W/DOPPLER COMPLETE: CPT | Performed by: EMERGENCY MEDICINE

## 2021-06-25 ENCOUNTER — HOSPITAL ENCOUNTER (OUTPATIENT)
Dept: CV DIAGNOSTICS | Facility: HOSPITAL | Age: 46
Discharge: HOME | End: 2021-06-25
Attending: EMERGENCY MEDICINE
Payer: COMMERCIAL

## 2021-06-25 DIAGNOSIS — R55 SYNCOPE, NEAR: ICD-10-CM

## 2021-06-25 DIAGNOSIS — R00.2 PALPITATIONS: ICD-10-CM

## 2021-06-25 PROCEDURE — 93225 XTRNL ECG REC<48 HRS REC: CPT | Performed by: EMERGENCY MEDICINE

## 2021-06-25 PROCEDURE — 93226 XTRNL ECG REC<48 HR SCAN A/R: CPT | Performed by: EMERGENCY MEDICINE

## 2021-06-25 PROCEDURE — 93227 XTRNL ECG REC<48 HR R&I: CPT | Performed by: EMERGENCY MEDICINE

## 2021-06-28 ENCOUNTER — LAB ENCOUNTER (OUTPATIENT)
Dept: LAB | Age: 46
End: 2021-06-28
Attending: DERMATOLOGY
Payer: COMMERCIAL

## 2021-06-28 DIAGNOSIS — C44.310 BCC (BASAL CELL CARCINOMA), FACE: ICD-10-CM

## 2021-06-28 DIAGNOSIS — L73.8 SEBACEOUS GLAND HYPERPLASIA: ICD-10-CM

## 2021-06-28 PROCEDURE — 88305 TISSUE EXAM BY PATHOLOGIST: CPT

## 2021-07-06 ENCOUNTER — TELEPHONE (OUTPATIENT)
Dept: FAMILY MEDICINE CLINIC | Facility: CLINIC | Age: 46
End: 2021-07-06

## 2021-07-06 NOTE — TELEPHONE ENCOUNTER
----- Message from Aviva Dos Santos MD sent at 7/6/2021 11:36 AM CDT -----  Unremarkable holter monitor  Follow up with cardiology as scheduled  Pls remind patient of sleep study that was previously ordered

## 2021-07-16 ENCOUNTER — PATIENT MESSAGE (OUTPATIENT)
Dept: FAMILY MEDICINE CLINIC | Facility: CLINIC | Age: 46
End: 2021-07-16

## 2021-07-16 ENCOUNTER — OFFICE VISIT (OUTPATIENT)
Dept: CARDIOLOGY CLINIC | Facility: CLINIC | Age: 46
End: 2021-07-16
Payer: COMMERCIAL

## 2021-07-16 VITALS
DIASTOLIC BLOOD PRESSURE: 74 MMHG | HEIGHT: 62.5 IN | BODY MASS INDEX: 43.06 KG/M2 | SYSTOLIC BLOOD PRESSURE: 114 MMHG | WEIGHT: 240 LBS | HEART RATE: 72 BPM | RESPIRATION RATE: 18 BRPM

## 2021-07-16 DIAGNOSIS — R06.00 DYSPNEA, UNSPECIFIED TYPE: Primary | ICD-10-CM

## 2021-07-16 PROCEDURE — 99203 OFFICE O/P NEW LOW 30 MIN: CPT | Performed by: NURSE PRACTITIONER

## 2021-07-16 PROCEDURE — 3074F SYST BP LT 130 MM HG: CPT | Performed by: NURSE PRACTITIONER

## 2021-07-16 PROCEDURE — 3078F DIAST BP <80 MM HG: CPT | Performed by: NURSE PRACTITIONER

## 2021-07-16 PROCEDURE — 3008F BODY MASS INDEX DOCD: CPT | Performed by: NURSE PRACTITIONER

## 2021-07-16 NOTE — PROGRESS NOTES
Padmini Stout is a 51year old female. Patient presents with:  Consult  Palpitations: occasional  Syncope: near syncope  Chest Pain: left sided cp  Dizziness: positional    HPI:   Patient comes in today for consultation.  She sees Dr. Meliza Caruso mouth nightly.  90 tablet 0   • Naltrexone-buPROPion HCl ER (CONTRAVE) 8-90 MG Oral Tablet 12 Hr Week 1: 1 tablet in AM. Week 2: 1 tablet in AM and PM. Week 3: 2 tablets in AM, 1 tablet in PM. Week 4: [place] 2 tablets in AM and PM. (Patient not taking: Enriqueta SARS-COV-2 BY PCR (RADHA)          Patient is doing fairly well from a cardiac standpoint. She has history of diabetes high cholesterol and hypertension.   Who had an episode of palpitations warm sensation in her body lasting several minutes about a khadijah

## 2021-07-19 NOTE — TELEPHONE ENCOUNTER
From: Marta Cannon  To: Almita Pink MD  Sent: 7/16/2021 4:28 PM CDT  Subject: Referral Request    Hi Dr Mary Beth Buitrago. I wanted to know if you can give me an order for a Lumbar MRI.  I am still having lower back pain that goes to my Left leg

## 2021-07-26 ENCOUNTER — LAB ENCOUNTER (OUTPATIENT)
Dept: LAB | Age: 46
End: 2021-07-26
Attending: INTERNAL MEDICINE
Payer: COMMERCIAL

## 2021-07-26 DIAGNOSIS — Z01.812 PRE-PROCEDURE LAB EXAM: Primary | ICD-10-CM

## 2021-07-26 PROBLEM — R11.0 NAUSEA: Status: ACTIVE | Noted: 2021-07-26

## 2021-07-26 PROBLEM — K29.70 GASTRITIS: Status: ACTIVE | Noted: 2021-07-26

## 2021-07-26 PROBLEM — R19.7 DIARRHEA, UNSPECIFIED: Status: ACTIVE | Noted: 2021-07-26

## 2021-07-26 PROBLEM — R10.13 ABDOMINAL PAIN, EPIGASTRIC: Status: ACTIVE | Noted: 2021-07-26

## 2021-07-26 LAB — SARS-COV-2 RNA RESP QL NAA+PROBE: NOT DETECTED

## 2021-08-16 DIAGNOSIS — Z51.81 THERAPEUTIC DRUG MONITORING: ICD-10-CM

## 2021-08-16 DIAGNOSIS — E11.59 HYPERTENSION ASSOCIATED WITH DIABETES (HCC): ICD-10-CM

## 2021-08-16 DIAGNOSIS — E11.9 CONTROLLED TYPE 2 DIABETES MELLITUS WITHOUT COMPLICATION, WITHOUT LONG-TERM CURRENT USE OF INSULIN (HCC): ICD-10-CM

## 2021-08-16 DIAGNOSIS — I15.2 HYPERTENSION ASSOCIATED WITH DIABETES (HCC): ICD-10-CM

## 2021-08-16 NOTE — TELEPHONE ENCOUNTER
Pt called and is requesting for medication refills of Ozempic (1 MG/DOSE) 2 MG/1.5ML Subcutaneous Solution Pen-injector LF: 01/07/21, Losartan Potassium 50 MG Oral Tablet (COZAAR) LF: 01/18/21, and Atorvastatin Calcium 10 MG Oral Tablet (LIPITOR) LF: 12/01

## 2021-08-16 NOTE — TELEPHONE ENCOUNTER
Pt requesting 3 month supply for ozempic injections, losartan, and atorvastatin to go to [facility] in  [place]. Pt is all out and has video visit 8/19.

## 2021-08-18 RX ORDER — SEMAGLUTIDE 1.34 MG/ML
1 INJECTION, SOLUTION SUBCUTANEOUS WEEKLY
Refills: 0 | OUTPATIENT
Start: 2021-08-18

## 2021-08-18 RX ORDER — LOSARTAN POTASSIUM 50 MG/1
50 TABLET ORAL DAILY
Qty: 90 TABLET | Refills: 0 | Status: SHIPPED | OUTPATIENT
Start: 2021-08-18 | End: 2021-08-19

## 2021-08-18 RX ORDER — ATORVASTATIN CALCIUM 10 MG/1
10 TABLET, FILM COATED ORAL NIGHTLY
Qty: 90 TABLET | Refills: 0 | Status: SHIPPED | OUTPATIENT
Start: 2021-08-18 | End: 2021-08-19

## 2021-08-19 ENCOUNTER — LAB ENCOUNTER (OUTPATIENT)
Dept: LAB | Age: 46
End: 2021-08-19
Attending: INTERNAL MEDICINE
Payer: COMMERCIAL

## 2021-08-19 ENCOUNTER — TELEMEDICINE (OUTPATIENT)
Dept: FAMILY MEDICINE CLINIC | Facility: CLINIC | Age: 46
End: 2021-08-19

## 2021-08-19 VITALS — BODY MASS INDEX: 43 KG/M2 | WEIGHT: 240 LBS | SYSTOLIC BLOOD PRESSURE: 120 MMHG | DIASTOLIC BLOOD PRESSURE: 76 MMHG

## 2021-08-19 DIAGNOSIS — I15.2 HYPERTENSION ASSOCIATED WITH DIABETES (HCC): ICD-10-CM

## 2021-08-19 DIAGNOSIS — Z13.21 SCREENING FOR ENDOCRINE, NUTRITIONAL, METABOLIC AND IMMUNITY DISORDER: ICD-10-CM

## 2021-08-19 DIAGNOSIS — E11.9 CONTROLLED TYPE 2 DIABETES MELLITUS WITHOUT COMPLICATION, WITHOUT LONG-TERM CURRENT USE OF INSULIN (HCC): Primary | ICD-10-CM

## 2021-08-19 DIAGNOSIS — E11.59 HYPERTENSION ASSOCIATED WITH DIABETES (HCC): ICD-10-CM

## 2021-08-19 DIAGNOSIS — E66.01 MORBID OBESITY WITH BMI OF 40.0-44.9, ADULT (HCC): ICD-10-CM

## 2021-08-19 DIAGNOSIS — Z13.29 SCREENING FOR ENDOCRINE, NUTRITIONAL, METABOLIC AND IMMUNITY DISORDER: ICD-10-CM

## 2021-08-19 DIAGNOSIS — Z51.81 THERAPEUTIC DRUG MONITORING: ICD-10-CM

## 2021-08-19 DIAGNOSIS — Z13.228 SCREENING FOR ENDOCRINE, NUTRITIONAL, METABOLIC AND IMMUNITY DISORDER: ICD-10-CM

## 2021-08-19 DIAGNOSIS — Z13.0 SCREENING FOR ENDOCRINE, NUTRITIONAL, METABOLIC AND IMMUNITY DISORDER: ICD-10-CM

## 2021-08-19 DIAGNOSIS — R19.7 DIARRHEA, UNSPECIFIED TYPE: ICD-10-CM

## 2021-08-19 LAB
ADENOVIRUS F 40/41 PCR: NEGATIVE
ASTROVIRUS PCR: NEGATIVE
C CAYETANENSIS DNA SPEC QL NAA+PROBE: NEGATIVE
C DIFF TOX B STL QL: NEGATIVE
CAMPY SP DNA.DIARRHEA STL QL NAA+PROBE: NEGATIVE
CRYPTOSP DNA SPEC QL NAA+PROBE: NEGATIVE
EAEC PAA PLAS AGGR+AATA ST NAA+NON-PRB: NEGATIVE
EC STX1+STX2 + H7 FLIC SPEC NAA+PROBE: NEGATIVE
ENTAMOEBA HISTOLYTICA PCR: NEGATIVE
EPEC EAE GENE STL QL NAA+NON-PROBE: POSITIVE
ETEC LTA+ST1A+ST1B TOX ST NAA+NON-PROBE: NEGATIVE
GIARDIA LAMBLIA PCR: NEGATIVE
NOROVIRUS GI/GII PCR: NEGATIVE
P SHIGELLOIDES DNA STL QL NAA+PROBE: NEGATIVE
ROTAVIRUS A PCR: NEGATIVE
SALMONELLA DNA SPEC QL NAA+PROBE: NEGATIVE
SAPOVIRUS PCR: NEGATIVE
SHIGELLA SP+EIEC IPAH ST NAA+NON-PROBE: NEGATIVE
V CHOLERAE DNA SPEC QL NAA+PROBE: NEGATIVE
VIBRIO DNA SPEC NAA+PROBE: NEGATIVE
YERSINIA DNA SPEC NAA+PROBE: NEGATIVE

## 2021-08-19 PROCEDURE — 87493 C DIFF AMPLIFIED PROBE: CPT

## 2021-08-19 PROCEDURE — 83993 ASSAY FOR CALPROTECTIN FECAL: CPT

## 2021-08-19 PROCEDURE — 3074F SYST BP LT 130 MM HG: CPT | Performed by: EMERGENCY MEDICINE

## 2021-08-19 PROCEDURE — 99214 OFFICE O/P EST MOD 30 MIN: CPT | Performed by: EMERGENCY MEDICINE

## 2021-08-19 PROCEDURE — 87329 GIARDIA AG IA: CPT

## 2021-08-19 PROCEDURE — 82705 FATS/LIPIDS FECES QUAL: CPT

## 2021-08-19 PROCEDURE — 87507 IADNA-DNA/RNA PROBE TQ 12-25: CPT

## 2021-08-19 PROCEDURE — 87272 CRYPTOSPORIDIUM AG IF: CPT

## 2021-08-19 PROCEDURE — 3078F DIAST BP <80 MM HG: CPT | Performed by: EMERGENCY MEDICINE

## 2021-08-19 PROCEDURE — 82656 EL-1 FECAL QUAL/SEMIQ: CPT

## 2021-08-19 RX ORDER — SEMAGLUTIDE 1.34 MG/ML
1 INJECTION, SOLUTION SUBCUTANEOUS WEEKLY
Qty: 2 EACH | Refills: 0 | Status: SHIPPED | OUTPATIENT
Start: 2021-08-19 | End: 2021-08-27

## 2021-08-19 RX ORDER — LOSARTAN POTASSIUM 50 MG/1
50 TABLET ORAL DAILY
Qty: 90 TABLET | Refills: 0 | Status: SHIPPED | OUTPATIENT
Start: 2021-08-19 | End: 2021-11-10

## 2021-08-19 RX ORDER — ATORVASTATIN CALCIUM 10 MG/1
10 TABLET, FILM COATED ORAL NIGHTLY
Qty: 90 TABLET | Refills: 0 | Status: SHIPPED | OUTPATIENT
Start: 2021-08-19 | End: 2021-08-23

## 2021-08-19 NOTE — PROGRESS NOTES
This is a telemedicine visit with live, interactive video and audio. Minus Thor verbally consents to a Virtual/Telephone Check-In visit on 08/19/21.     Patient understands and accepts financial responsibility for any deductible, co-ins Social History    Tobacco Use      Smoking status: Never Smoker      Smokeless tobacco: Never Used    Alcohol use: Yes      Comment: soc    Drug use: No       No Known Allergies   Current Outpatient Medications   Medication Sig Dispense Refill   • OZEMPI 2 (two) times daily with meals. Dispense: 180 tablet; Refill: 0  - HEMOGLOBIN A1C; Future    2. Hypertension associated with diabetes ([facility] [address]  Stable continue llosartan  - losartan Potassium 50 MG Oral Tab; Take 1 tablet (50 mg total) by mouth daily.   Dispe

## 2021-08-19 NOTE — TELEPHONE ENCOUNTER
Losartan and Atorvastatin refilled  Ozempic NOT refilled  Refill of ozempic per weight loss clinic as I was not original prescriber

## 2021-08-19 NOTE — PROGRESS NOTES
Pt found to be positive for E. Coli. Called pt with no answer. [employer] message sent and Rx for Ciprofloxacin sent to pt's pharmacy.

## 2021-08-20 RX ORDER — SEMAGLUTIDE 1.34 MG/ML
1 INJECTION, SOLUTION SUBCUTANEOUS WEEKLY
Qty: 6 EACH | Refills: 0 | OUTPATIENT
Start: 2021-08-20

## 2021-08-21 ENCOUNTER — LAB ENCOUNTER (OUTPATIENT)
Dept: LAB | Facility: HOSPITAL | Age: 46
End: 2021-08-21
Attending: EMERGENCY MEDICINE
Payer: COMMERCIAL

## 2021-08-21 DIAGNOSIS — Z13.21 SCREENING FOR ENDOCRINE, NUTRITIONAL, METABOLIC AND IMMUNITY DISORDER: ICD-10-CM

## 2021-08-21 DIAGNOSIS — E11.9 CONTROLLED TYPE 2 DIABETES MELLITUS WITHOUT COMPLICATION, WITHOUT LONG-TERM CURRENT USE OF INSULIN (HCC): ICD-10-CM

## 2021-08-21 DIAGNOSIS — Z13.0 SCREENING FOR ENDOCRINE, NUTRITIONAL, METABOLIC AND IMMUNITY DISORDER: ICD-10-CM

## 2021-08-21 DIAGNOSIS — Z13.228 SCREENING FOR ENDOCRINE, NUTRITIONAL, METABOLIC AND IMMUNITY DISORDER: ICD-10-CM

## 2021-08-21 DIAGNOSIS — Z13.29 SCREENING FOR ENDOCRINE, NUTRITIONAL, METABOLIC AND IMMUNITY DISORDER: ICD-10-CM

## 2021-08-21 LAB
CALPROTECTIN, FECAL: 164 UG/G
CHOLEST SMN-MCNC: 182 MG/DL (ref ?–200)
EST. AVERAGE GLUCOSE BLD GHB EST-MCNC: 131 MG/DL (ref 68–126)
HBA1C MFR BLD HPLC: 6.2 % (ref ?–5.7)
HDLC SERPL-MCNC: 54 MG/DL (ref 40–59)
LDLC SERPL CALC-MCNC: 114 MG/DL (ref ?–100)
NEUTRAL FAT, FECAL: NORMAL
NONHDLC SERPL-MCNC: 128 MG/DL (ref ?–130)
PATIENT FASTING Y/N/NP: YES
SPLIT FAT, FECAL: NORMAL
TRIGL SERPL-MCNC: 76 MG/DL (ref 30–149)
TSI SER-ACNC: 3.44 MIU/ML (ref 0.36–3.74)
VLDLC SERPL CALC-MCNC: 13 MG/DL (ref 0–30)

## 2021-08-21 PROCEDURE — 83036 HEMOGLOBIN GLYCOSYLATED A1C: CPT

## 2021-08-21 PROCEDURE — 36415 COLL VENOUS BLD VENIPUNCTURE: CPT

## 2021-08-21 PROCEDURE — 84443 ASSAY THYROID STIM HORMONE: CPT

## 2021-08-21 PROCEDURE — 80061 LIPID PANEL: CPT

## 2021-08-22 LAB — PANCREATIC ELASTASE , FECAL: >800 UG/G

## 2021-08-23 ENCOUNTER — TELEPHONE (OUTPATIENT)
Dept: FAMILY MEDICINE CLINIC | Facility: CLINIC | Age: 46
End: 2021-08-23

## 2021-08-23 DIAGNOSIS — E78.5 DYSLIPIDEMIA: Primary | ICD-10-CM

## 2021-08-23 RX ORDER — ATORVASTATIN CALCIUM 20 MG/1
20 TABLET, FILM COATED ORAL NIGHTLY
Qty: 90 TABLET | Refills: 1 | Status: SHIPPED | OUTPATIENT
Start: 2021-08-23

## 2021-08-23 NOTE — TELEPHONE ENCOUNTER
----- Message from Genesis Alexandra MD sent at 8/23/2021  6:31 AM CDT -----  HBA1C stable continue with all DM labs  LDL not at goal, increase atorvastatin to 20 mg, repeat lipids and cmp in 1 month  Follow up in 3 months

## 2021-08-25 DIAGNOSIS — Z51.81 THERAPEUTIC DRUG MONITORING: ICD-10-CM

## 2021-08-25 DIAGNOSIS — E66.01 MORBID OBESITY WITH BMI OF 40.0-44.9, ADULT (HCC): ICD-10-CM

## 2021-08-26 RX ORDER — SEMAGLUTIDE 1.34 MG/ML
1 INJECTION, SOLUTION SUBCUTANEOUS WEEKLY
Qty: 2 EACH | Refills: 0 | OUTPATIENT
Start: 2021-08-26

## 2021-08-27 DIAGNOSIS — Z51.81 THERAPEUTIC DRUG MONITORING: ICD-10-CM

## 2021-08-27 DIAGNOSIS — E66.01 MORBID OBESITY WITH BMI OF 40.0-44.9, ADULT (HCC): ICD-10-CM

## 2021-08-27 RX ORDER — SEMAGLUTIDE 1.34 MG/ML
1 INJECTION, SOLUTION SUBCUTANEOUS WEEKLY
Qty: 6 EACH | Refills: 0 | Status: SHIPPED | OUTPATIENT
Start: 2021-08-27 | End: 2022-01-24

## 2021-11-10 DIAGNOSIS — E11.59 HYPERTENSION ASSOCIATED WITH DIABETES: ICD-10-CM

## 2021-11-10 DIAGNOSIS — I15.2 HYPERTENSION ASSOCIATED WITH DIABETES: ICD-10-CM

## 2021-11-10 DIAGNOSIS — E11.9 CONTROLLED TYPE 2 DIABETES MELLITUS WITHOUT COMPLICATION, WITHOUT LONG-TERM CURRENT USE OF INSULIN (HCC): ICD-10-CM

## 2021-11-10 RX ORDER — LOSARTAN POTASSIUM 50 MG/1
TABLET ORAL
Qty: 30 TABLET | Refills: 0 | Status: SHIPPED | OUTPATIENT
Start: 2021-11-10 | End: 2022-02-16

## 2021-12-04 ENCOUNTER — TELEPHONE (OUTPATIENT)
Dept: FAMILY MEDICINE CLINIC | Facility: CLINIC | Age: 46
End: 2021-12-04

## 2021-12-04 NOTE — TELEPHONE ENCOUNTER
Pt is requesting an order for bloodwork, she is scheduled for a medication follow up for Friday 12/10 with Dr Mary Munoz.     The appt is virtual since she may have possibly been exposed to Obi, she stated she will not come in for bloodwork until she has go

## 2021-12-10 ENCOUNTER — VIRTUAL PHONE E/M (OUTPATIENT)
Dept: FAMILY MEDICINE CLINIC | Facility: CLINIC | Age: 46
End: 2021-12-10
Payer: COMMERCIAL

## 2021-12-10 DIAGNOSIS — R53.83 FATIGUE, UNSPECIFIED TYPE: ICD-10-CM

## 2021-12-10 DIAGNOSIS — E11.9 CONTROLLED TYPE 2 DIABETES MELLITUS WITHOUT COMPLICATION, WITHOUT LONG-TERM CURRENT USE OF INSULIN (HCC): Primary | ICD-10-CM

## 2021-12-10 PROCEDURE — 99213 OFFICE O/P EST LOW 20 MIN: CPT | Performed by: EMERGENCY MEDICINE

## 2021-12-10 NOTE — PROGRESS NOTES
This is a telemedicine visit with live, interactive video and audio. Bautista Morin verbally consents to a Telephone Check-In visit on 12/10/21.     Patient understands and accepts financial responsibility for any deductible, co-insurance a Maternal Aunt    • Diabetes Brother       Social History    Tobacco Use      Smoking status: Never Smoker      Smokeless tobacco: Never Used    Alcohol use: Yes      Comment: soc    Drug use: No       No Known Allergies   Current Outpatient Medications   M

## 2021-12-15 ENCOUNTER — LAB ENCOUNTER (OUTPATIENT)
Dept: LAB | Age: 46
End: 2021-12-15
Attending: EMERGENCY MEDICINE
Payer: COMMERCIAL

## 2021-12-15 DIAGNOSIS — R79.89 ABNORMAL TSH: ICD-10-CM

## 2021-12-15 DIAGNOSIS — E11.9 CONTROLLED TYPE 2 DIABETES MELLITUS WITHOUT COMPLICATION, WITHOUT LONG-TERM CURRENT USE OF INSULIN (HCC): ICD-10-CM

## 2021-12-15 DIAGNOSIS — R53.83 FATIGUE, UNSPECIFIED TYPE: ICD-10-CM

## 2021-12-15 DIAGNOSIS — E78.5 DYSLIPIDEMIA: ICD-10-CM

## 2021-12-15 PROCEDURE — 80053 COMPREHEN METABOLIC PANEL: CPT

## 2021-12-15 PROCEDURE — 84443 ASSAY THYROID STIM HORMONE: CPT

## 2021-12-15 PROCEDURE — 80061 LIPID PANEL: CPT

## 2021-12-15 PROCEDURE — 82043 UR ALBUMIN QUANTITATIVE: CPT

## 2021-12-15 PROCEDURE — 83036 HEMOGLOBIN GLYCOSYLATED A1C: CPT

## 2021-12-15 PROCEDURE — 82570 ASSAY OF URINE CREATININE: CPT

## 2021-12-15 PROCEDURE — 36415 COLL VENOUS BLD VENIPUNCTURE: CPT

## 2021-12-15 PROCEDURE — 84439 ASSAY OF FREE THYROXINE: CPT

## 2021-12-17 ENCOUNTER — PATIENT MESSAGE (OUTPATIENT)
Dept: FAMILY MEDICINE CLINIC | Facility: CLINIC | Age: 46
End: 2021-12-17

## 2021-12-17 DIAGNOSIS — R79.89 ELEVATED TSH: Primary | ICD-10-CM

## 2021-12-17 NOTE — TELEPHONE ENCOUNTER
Pt requesting for lab interpretation especially TSH level. Pt asking if she needs to be placed on medication. Please advise. Thank you.

## 2021-12-17 NOTE — TELEPHONE ENCOUNTER
From: Keyanna Bruce  To: Hong Carmona  Sent: 12/5/2021 1:06 PM CST  Subject: labs    Hi Mireya   You have outstanding lab work in the system from August that needs to be completed.  When you are ready to have done please fast for 10 hours for the l

## 2021-12-22 ENCOUNTER — LAB ENCOUNTER (OUTPATIENT)
Dept: LAB | Age: 46
End: 2021-12-22
Attending: INTERNAL MEDICINE
Payer: COMMERCIAL

## 2021-12-22 DIAGNOSIS — R19.5 ABNORMAL STOOL TEST: ICD-10-CM

## 2021-12-22 PROCEDURE — 83993 ASSAY FOR CALPROTECTIN FECAL: CPT

## 2021-12-22 NOTE — TELEPHONE ENCOUNTER
Pt made aware of lab results and states she was diagnosed with Hashimoto's in the past and is worried that her level is rising, is gaining weight and is fatigued. Please advise. Thank you.    Pt states she has not been taking Atorvastatin daily and will sta

## 2021-12-22 NOTE — TELEPHONE ENCOUNTER
----- Message from Esther Olivier MD sent at 12/21/2021  5:23 PM CST -----  TSH a little high but T4 stable  Monitor for now  Repeat TSH in 6 months    HBA1c stable, continue with all medications, follow up with OV in 3 months      LDL not at goal, pls

## 2021-12-23 NOTE — TELEPHONE ENCOUNTER
TSH is minimally elevated but T4 is WNL  Recommend recheck in 3-6 months  If patient is still concerned, pt can follow up with endocrinology, Dr. Renaldo Will who she has seen in the past

## 2022-01-06 ENCOUNTER — HOSPITAL ENCOUNTER (OUTPATIENT)
Age: 47
Discharge: HOME | End: 2022-01-06
Payer: COMMERCIAL

## 2022-01-06 VITALS
SYSTOLIC BLOOD PRESSURE: 125 MMHG | DIASTOLIC BLOOD PRESSURE: 80 MMHG | TEMPERATURE: 99 F | RESPIRATION RATE: 18 BRPM | OXYGEN SATURATION: 98 % | HEART RATE: 90 BPM

## 2022-01-06 DIAGNOSIS — Z20.822 ENCOUNTER FOR LABORATORY TESTING FOR COVID-19 VIRUS: Primary | ICD-10-CM

## 2022-01-06 DIAGNOSIS — U07.1 COVID-19: ICD-10-CM

## 2022-01-06 LAB
S PYO AG THROAT QL: NEGATIVE
SARS-COV-2 RNA RESP QL NAA+PROBE: DETECTED

## 2022-01-06 PROCEDURE — 87880 STREP A ASSAY W/OPTIC: CPT | Performed by: NURSE PRACTITIONER

## 2022-01-06 PROCEDURE — U0002 COVID-19 LAB TEST NON-CDC: HCPCS | Performed by: NURSE PRACTITIONER

## 2022-01-06 PROCEDURE — 99213 OFFICE O/P EST LOW 20 MIN: CPT | Performed by: NURSE PRACTITIONER

## 2022-01-07 NOTE — ED INITIAL ASSESSMENT (HPI)
Pt c/o headache and cough that started on Friday. Pt with c/o low grade fever this weekend. Pt with nasal congestion on Tuesday. Pt tested negative twice this week.

## 2022-01-07 NOTE — ED PROVIDER NOTES
Patient Seen in: [facility] [address] [place] [address]      History   Patient presents with:  Cough/URI    Stated Complaint: Covid Exposure- Cough, Headaches, Sore Throat, Chills, Body Pains    Subjective:   HPI      CHIEF COMPLAINT:   Patient presents with:  Cough/URI Hemorrhoids    • Hyperlipidemia    • Indigestion    • Irregular bowel habits    • Nausea    • Night sweats    • Thyroid disease    • Uncomfortable fullness after meals    • Weight gain       Past Surgical History:   Procedure Laterality Date   •  Vitals [01/06/22 1905]   /80   Pulse 90   Resp 18   Temp 98.8 °F (37.1 °C)   Temp src Temporal   SpO2 98 %   O2 Device None (Room air)       Current:/80   Pulse 90   Temp 98.8 °F (37.1 °C) (Temporal)   Resp 18   SpO2 98%         Physical Exam Disposition and Plan     Clinical Impression:  Encounter for laboratory testing for COVID-19 virus  (primary encounter diagnosis)  COVID-19     Disposition:  Discharge  1/6/2022  7:39 pm    Follow-up:  No follow-up provider specified.         Medication

## 2022-01-09 ENCOUNTER — TELEPHONE (OUTPATIENT)
Dept: INTERNAL MEDICINE CLINIC | Facility: HOSPITAL | Age: 47
End: 2022-01-09

## 2022-01-09 NOTE — TELEPHONE ENCOUNTER
Results and RTW guidelines:    COVID RESULT:    [] Viewed by employee in [address] RTW plan and instructions as indicated on triage call. Manager notified. Estimated RTW date:   [x] Discussed with employee   [] Unable to reach by phone.   Sent via [employer] management about RTW and if symptoms worsen                - If outside testing completed, bring a copy of result to RTW appointment           Notes:     RTW PLAN:    [x]  If COVID positive results, off work minimum of 5 days from positive test or onset of of symptom onset: 12/31/2021    SYMPTOMS:  [] asymptomatic    • Fever   > 100F             Yes [x]      • Cough                          Yes [x]      • Shortness of breath  Yes []      • Congestion                 Yes [x]      • Runny nose                Y and there are persistent symptoms or high suspicion, repeat with [facility] in 1-2 days.          No- Follow below guidelines-     [] Asymptomatic Exposed at work or at home:    [] Asymptomatic AND Vaccinated or Unvaccinated or Prior infection in past 80 days:

## 2022-01-24 ENCOUNTER — TELEPHONE (OUTPATIENT)
Dept: FAMILY MEDICINE CLINIC | Facility: CLINIC | Age: 47
End: 2022-01-24

## 2022-01-24 DIAGNOSIS — Z51.81 THERAPEUTIC DRUG MONITORING: ICD-10-CM

## 2022-01-24 DIAGNOSIS — E66.01 MORBID OBESITY WITH BMI OF 40.0-44.9, ADULT (HCC): ICD-10-CM

## 2022-01-24 RX ORDER — SEMAGLUTIDE 1.34 MG/ML
1 INJECTION, SOLUTION SUBCUTANEOUS WEEKLY
Qty: 6 EACH | Refills: 0 | Status: SHIPPED | OUTPATIENT
Start: 2022-01-24 | End: 2022-01-31

## 2022-01-24 NOTE — TELEPHONE ENCOUNTER
Patient is calling, needs this medication as 90 supply or insurance will not cover     Send to [facility]     OZEMPIC, 1 MG/DOSE, 2 MG/1.5ML Subcutaneous Solution Pen-injector    Last OV 12/10, 8/19

## 2022-01-24 NOTE — TELEPHONE ENCOUNTER
Pt calling and is requesting for medication refill of Ozempic (1 MG/DOSE) 2 MG/1.5ML Subcutaneous Solution Pen-injector. Please approve or deny pending Rx. Thank you.    LOV (virtual phone): 12/10/21  LF: 08/27/21

## 2022-01-26 ENCOUNTER — TELEPHONE (OUTPATIENT)
Dept: UROLOGY | Facility: HOSPITAL | Age: 47
End: 2022-01-26

## 2022-01-26 ENCOUNTER — LAB ENCOUNTER (OUTPATIENT)
Dept: LAB | Age: 47
End: 2022-01-26
Attending: OBSTETRICS & GYNECOLOGY
Payer: COMMERCIAL

## 2022-01-26 DIAGNOSIS — N89.8 VAGINAL ITCHING: Primary | ICD-10-CM

## 2022-01-26 PROCEDURE — 87660 TRICHOMONAS VAGIN DIR PROBE: CPT

## 2022-01-26 PROCEDURE — 87480 CANDIDA DNA DIR PROBE: CPT

## 2022-01-26 PROCEDURE — 87510 GARDNER VAG DNA DIR PROBE: CPT

## 2022-01-26 NOTE — TELEPHONE ENCOUNTER
Pt called today w/ c/o vaginal itching. Has a white discharge. Pt has had yeast infections in the past and feels this is what she has. Will send a vag panel today. TORB Dr Yunier Chaparro 150mg po q 3 d x 2 doses.

## 2022-01-27 RX ORDER — METRONIDAZOLE 7.5 MG/G
5 GEL VAGINAL NIGHTLY
Qty: 70 G | Refills: 3 | Status: SHIPPED | OUTPATIENT
Start: 2022-01-27

## 2022-01-27 NOTE — TELEPHONE ENCOUNTER
Pt called this am w/ vag panel results. Pt had not picked up the diflucan yet. Instructed not to take. BRADY NOEL metrogel 5g vaginal nightly x 5d. Pt verbalized an understanding and agrees w/ plan. All questions answered.

## 2022-01-31 RX ORDER — SEMAGLUTIDE 1.34 MG/ML
1 INJECTION, SOLUTION SUBCUTANEOUS WEEKLY
Qty: 6 EACH | Refills: 0 | Status: SHIPPED | OUTPATIENT
Start: 2022-01-31 | End: 2022-02-07

## 2022-01-31 NOTE — TELEPHONE ENCOUNTER
Pt called to check status of refill. Pt previously requesting Ozempic be sent to [facility] on file, rx was sent to [facility]. Pls advise.

## 2022-02-07 RX ORDER — SEMAGLUTIDE 1.34 MG/ML
1 INJECTION, SOLUTION SUBCUTANEOUS WEEKLY
Qty: 9 ML | Refills: 0 | Status: SHIPPED | OUTPATIENT
Start: 2022-02-07

## 2022-02-07 NOTE — TELEPHONE ENCOUNTER
Patient calling the office back. Patient states that she spoke with the pharmacy and the pharmacy states that the cost is so high because they do not have an order for a 90 day supply.  Explained that 90 day supply was sent on 1/31/2022 and nurse earlier co

## 2022-02-07 NOTE — TELEPHONE ENCOUNTER
Spoke w/ Lesley Morgan at [facility]. Information he provided me was that a month supply for the patient is actually $25.00. He also checked for 90 day supply which is $75.00. He will fill 3 month supply for patient.

## 2022-02-07 NOTE — TELEPHONE ENCOUNTER
Spoke w/ [facility]. A 90 day supply was sent but medication is still $1000. Pharm tech states pt has a deductible. Called pt, VM left to call back. My Chart message sent.

## 2022-02-07 NOTE — TELEPHONE ENCOUNTER
Pt  Called and stated Pt has already missed 2 doses because we can't get it right. Pt needs a 3 month supply sent to [facility] in order for ins to cover.

## 2022-02-08 ENCOUNTER — HOSPITAL ENCOUNTER (OUTPATIENT)
Age: 47
Discharge: HOME | End: 2022-02-08
Payer: COMMERCIAL

## 2022-02-08 ENCOUNTER — APPOINTMENT (OUTPATIENT)
Dept: GENERAL RADIOLOGY | Age: 47
End: 2022-02-08
Attending: NURSE PRACTITIONER
Payer: COMMERCIAL

## 2022-02-08 VITALS
DIASTOLIC BLOOD PRESSURE: 85 MMHG | OXYGEN SATURATION: 100 % | HEART RATE: 87 BPM | BODY MASS INDEX: 46.93 KG/M2 | SYSTOLIC BLOOD PRESSURE: 139 MMHG | HEIGHT: 62 IN | WEIGHT: 255 LBS | TEMPERATURE: 97 F | RESPIRATION RATE: 18 BRPM

## 2022-02-08 DIAGNOSIS — J20.9 ACUTE BRONCHITIS, UNSPECIFIED ORGANISM: ICD-10-CM

## 2022-02-08 DIAGNOSIS — R05.9 COUGH: Primary | ICD-10-CM

## 2022-02-08 PROCEDURE — 71046 X-RAY EXAM CHEST 2 VIEWS: CPT | Performed by: NURSE PRACTITIONER

## 2022-02-08 PROCEDURE — 99203 OFFICE O/P NEW LOW 30 MIN: CPT | Performed by: NURSE PRACTITIONER

## 2022-02-08 RX ORDER — BENZONATATE 100 MG/1
100 CAPSULE ORAL 3 TIMES DAILY PRN
Qty: 30 CAPSULE | Refills: 0 | Status: SHIPPED | OUTPATIENT
Start: 2022-02-08 | End: 2022-03-10

## 2022-02-08 RX ORDER — ALBUTEROL SULFATE 90 UG/1
2 AEROSOL, METERED RESPIRATORY (INHALATION) EVERY 4 HOURS PRN
Qty: 1 EACH | Refills: 0 | Status: SHIPPED | OUTPATIENT
Start: 2022-02-08 | End: 2022-03-10

## 2022-02-08 RX ORDER — PREDNISONE 20 MG/1
20 TABLET ORAL DAILY
Qty: 5 TABLET | Refills: 0 | Status: SHIPPED | OUTPATIENT
Start: 2022-02-08 | End: 2022-02-13

## 2022-02-09 NOTE — ED INITIAL ASSESSMENT (HPI)
Patient c/o cough for 5 days. States she was + for Covid on 1/1/2022 and had a cough at that time and resolved.

## 2022-02-16 RX ORDER — LOSARTAN POTASSIUM 50 MG/1
50 TABLET ORAL DAILY
Qty: 30 TABLET | Refills: 0 | Status: SHIPPED | OUTPATIENT
Start: 2022-02-16 | End: 2022-03-10

## 2022-02-16 RX ORDER — LOSARTAN POTASSIUM 50 MG/1
TABLET ORAL
Qty: 30 TABLET | Refills: 0 | OUTPATIENT
Start: 2022-02-16

## 2022-03-10 DIAGNOSIS — I15.2 HYPERTENSION ASSOCIATED WITH DIABETES: ICD-10-CM

## 2022-03-10 DIAGNOSIS — E11.59 HYPERTENSION ASSOCIATED WITH DIABETES: ICD-10-CM

## 2022-03-12 RX ORDER — LOSARTAN POTASSIUM 50 MG/1
50 TABLET ORAL DAILY
Qty: 90 TABLET | Refills: 0 | Status: SHIPPED | OUTPATIENT
Start: 2022-03-12 | End: 2022-05-02

## 2022-05-02 ENCOUNTER — TELEPHONE (OUTPATIENT)
Dept: FAMILY MEDICINE CLINIC | Facility: CLINIC | Age: 47
End: 2022-05-02

## 2022-05-02 ENCOUNTER — OFFICE VISIT (OUTPATIENT)
Dept: FAMILY MEDICINE CLINIC | Facility: CLINIC | Age: 47
End: 2022-05-02
Payer: COMMERCIAL

## 2022-05-02 VITALS
HEART RATE: 76 BPM | HEIGHT: 62 IN | WEIGHT: 259 LBS | DIASTOLIC BLOOD PRESSURE: 94 MMHG | SYSTOLIC BLOOD PRESSURE: 126 MMHG | RESPIRATION RATE: 18 BRPM | BODY MASS INDEX: 47.66 KG/M2 | OXYGEN SATURATION: 97 %

## 2022-05-02 DIAGNOSIS — Z12.11 SCREENING FOR COLON CANCER: ICD-10-CM

## 2022-05-02 DIAGNOSIS — E11.59 HYPERTENSION ASSOCIATED WITH DIABETES (HCC): ICD-10-CM

## 2022-05-02 DIAGNOSIS — E11.9 CONTROLLED TYPE 2 DIABETES MELLITUS WITHOUT COMPLICATION, WITHOUT LONG-TERM CURRENT USE OF INSULIN (HCC): ICD-10-CM
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
## 2022-05-02 DIAGNOSIS — Z12.31 ENCOUNTER FOR SCREENING MAMMOGRAM FOR BREAST CANCER: ICD-10-CM

## 2022-05-02 DIAGNOSIS — I15.2 HYPERTENSION ASSOCIATED WITH DIABETES (HCC): ICD-10-CM

## 2022-05-02 DIAGNOSIS — E78.5 DYSLIPIDEMIA: ICD-10-CM

## 2022-05-02 DIAGNOSIS — G43.709 CHRONIC MIGRAINE WITHOUT AURA WITHOUT STATUS MIGRAINOSUS, NOT INTRACTABLE: ICD-10-CM

## 2022-05-02 DIAGNOSIS — Z00.00 WELLNESS EXAMINATION: Primary | ICD-10-CM

## 2022-05-02 PROCEDURE — 3074F SYST BP LT 130 MM HG: CPT | Performed by: FAMILY MEDICINE

## 2022-05-02 PROCEDURE — 99396 PREV VISIT EST AGE 40-64: CPT | Performed by: FAMILY MEDICINE

## 2022-05-02 PROCEDURE — 3008F BODY MASS INDEX DOCD: CPT | Performed by: FAMILY MEDICINE

## 2022-05-02 PROCEDURE — 3080F DIAST BP >= 90 MM HG: CPT | Performed by: FAMILY MEDICINE

## 2022-05-02 PROCEDURE — 99214 OFFICE O/P EST MOD 30 MIN: CPT | Performed by: FAMILY MEDICINE

## 2022-05-02 RX ORDER — LOSARTAN POTASSIUM 100 MG/1
100 TABLET ORAL DAILY
Qty: 90 TABLET | Refills: 1 | Status: SHIPPED | OUTPATIENT
Start: 2022-05-02

## 2022-05-02 RX ORDER — LANCETS
1 EACH MISCELLANEOUS 3 TIMES DAILY
Qty: 300 EACH | Refills: 3 | Status: SHIPPED | OUTPATIENT
Start: 2022-05-02

## 2022-05-02 RX ORDER — UBROGEPANT 50 MG/1
50 TABLET ORAL
Qty: 5 TABLET | Refills: 0 | COMMUNITY
Start: 2022-05-02

## 2022-05-06 ENCOUNTER — TELEPHONE (OUTPATIENT)
Dept: FAMILY MEDICINE CLINIC | Facility: CLINIC | Age: 47
End: 2022-05-06

## 2022-05-09 NOTE — TELEPHONE ENCOUNTER
Called pt with number that was provided but pt did not answer. Did not leave  as this was a employment generic .

## 2022-05-09 NOTE — TELEPHONE ENCOUNTER
Pt returned call and was informed [facility] paperwork is completed. Pt requesting for [facility] paperwork to be faxed to mik  [place] [address] and to her place of employment at [phone]. CORDELIA for [employer] received. FMLA paperwork faxed to [address] [place] [address] at [phone]. Received fax confirmation. [place] paperwork faxed to pt's place of employment (per pt request) at [phone]. Received fax confirmation. Copy of [facility] paperwork sent to scan.

## 2022-05-26 ENCOUNTER — TELEPHONE (OUTPATIENT)
Dept: ADMINISTRATIVE | Age: 47
End: 2022-05-26

## 2022-05-26 DIAGNOSIS — E66.01 MORBID OBESITY WITH BMI OF 45.0-49.9, ADULT (HCC): Primary | ICD-10-CM

## 2022-05-26 NOTE — TELEPHONE ENCOUNTER
PATIENT REQUESTED A NEW REFERRAL TO SEE WEIGHT MANAGMENT JEFF YAP. For weigh gain treatment ,Please review and sign plan and care if you agree Thank you. Holly Moreno V      [facility] REFERRALS.

## 2022-06-04 ENCOUNTER — MED REC SCAN ONLY (OUTPATIENT)
Dept: FAMILY MEDICINE CLINIC | Facility: CLINIC | Age: 47
End: 2022-06-04

## 2022-06-06 ENCOUNTER — TELEPHONE (OUTPATIENT)
Dept: UROLOGY | Facility: CLINIC | Age: 47
End: 2022-06-06

## 2022-06-06 ENCOUNTER — LAB ENCOUNTER (OUTPATIENT)
Dept: LAB | Age: 47
End: 2022-06-06
Attending: OBSTETRICS & GYNECOLOGY
Payer: COMMERCIAL

## 2022-06-06 DIAGNOSIS — R39.9 UTI SYMPTOMS: Primary | ICD-10-CM

## 2022-06-06 LAB
CONTROL RUN WITHIN 24 HOURS?: YES
NITRITE URINE: POSITIVE

## 2022-06-06 RX ORDER — NITROFURANTOIN 25; 75 MG/1; MG/1
100 CAPSULE ORAL 2 TIMES DAILY
Qty: 14 CAPSULE | Refills: 0 | Status: SHIPPED | OUTPATIENT
Start: 2022-06-06

## 2022-06-06 NOTE — TELEPHONE ENCOUNTER
TC from pt. C/o dysuria, frequency and pink urine color. Denies a fever. Pt will leave sample at office for us to run. LYNN Jorgensen macrobid 100mg po BID x 7d. pt had cysto 9/24/19.

## 2022-06-17 ENCOUNTER — TELEPHONE (OUTPATIENT)
Dept: FAMILY MEDICINE CLINIC | Facility: CLINIC | Age: 47
End: 2022-06-17

## 2022-06-17 RX ORDER — SEMAGLUTIDE 2.68 MG/ML
INJECTION, SOLUTION SUBCUTANEOUS
COMMUNITY
Start: 2022-06-03

## 2022-06-17 RX ORDER — UBROGEPANT 50 MG/1
50 TABLET ORAL
Qty: 30 TABLET | Refills: 1 | Status: SHIPPED | OUTPATIENT
Start: 2022-06-17 | End: 2022-07-17

## 2022-06-17 NOTE — TELEPHONE ENCOUNTER
Pt was seen by Dr Lupe Andrews on 5/2/22 and received samples of Ubrelvy. Ok to send script to pharmacy?

## 2022-06-17 NOTE — TELEPHONE ENCOUNTER
Last office visit:  5/2/22      Requested medication:   ubrogepant (Margaret Morgan) 50 MG Oral Tab  Pharmacy:    [facility] [address] [facility] [phone], [phone]    Thank you

## 2022-06-21 ENCOUNTER — TELEPHONE (OUTPATIENT)
Dept: FAMILY MEDICINE CLINIC | Facility: CLINIC | Age: 47
End: 2022-06-21

## 2022-11-14 DIAGNOSIS — E11.9 CONTROLLED TYPE 2 DIABETES MELLITUS WITHOUT COMPLICATION, WITHOUT LONG-TERM CURRENT USE OF INSULIN (HCC): ICD-10-CM

## 2022-11-17 RX ORDER — BLOOD SUGAR DIAGNOSTIC
STRIP MISCELLANEOUS
Qty: 300 STRIP | Refills: 0 | Status: SHIPPED | OUTPATIENT
Start: 2022-11-17

## 2022-11-20 DIAGNOSIS — E11.9 CONTROLLED TYPE 2 DIABETES MELLITUS WITHOUT COMPLICATION, WITHOUT LONG-TERM CURRENT USE OF INSULIN (HCC): ICD-10-CM

## 2022-11-21 NOTE — TELEPHONE ENCOUNTER
30 day supply sent. Pt is scheduled for VV on 11/29, sent msge to [facility] to schedule in OV, pt needs A1C taken.

## 2022-11-22 ENCOUNTER — TELEPHONE (OUTPATIENT)
Dept: FAMILY MEDICINE CLINIC | Facility: CLINIC | Age: 47
End: 2022-11-22

## 2022-11-22 NOTE — TELEPHONE ENCOUNTER
Pt called and stated she moved and will be looking for new PCP. However, pt scheduled video visit with intent to complete lab orders prior to video visit. Pt requesting lab orders to test thyroid and a reverse T3 lab. Please advise, thank you!

## 2022-11-22 NOTE — TELEPHONE ENCOUNTER
PSR: Please contact pt and inform her she has active lab orders:    CMP  A1C  Lipid  TSH + free T4     Sent mychart to pt.

## 2022-11-26 ENCOUNTER — LAB ENCOUNTER (OUTPATIENT)
Dept: LAB | Age: 47
End: 2022-11-26
Attending: FAMILY MEDICINE
Payer: MEDICAID

## 2022-11-26 DIAGNOSIS — E11.9 CONTROLLED TYPE 2 DIABETES MELLITUS WITHOUT COMPLICATION, WITHOUT LONG-TERM CURRENT USE OF INSULIN (HCC): ICD-10-CM

## 2022-11-26 DIAGNOSIS — E78.5 DYSLIPIDEMIA: ICD-10-CM

## 2022-11-26 DIAGNOSIS — R79.89 ELEVATED TSH: ICD-10-CM

## 2022-11-26 LAB
ALBUMIN SERPL-MCNC: 3.8 G/DL (ref 3.4–5)
ALBUMIN/GLOB SERPL: 1.1 {RATIO} (ref 1–2)
ALP LIVER SERPL-CCNC: 57 U/L
ALT SERPL-CCNC: 23 U/L
ANION GAP SERPL CALC-SCNC: 4 MMOL/L (ref 0–18)
AST SERPL-CCNC: 17 U/L (ref 15–37)
BILIRUB SERPL-MCNC: 0.7 MG/DL (ref 0.1–2)
BUN BLD-MCNC: 13 MG/DL (ref 7–18)
CALCIUM BLD-MCNC: 9.3 MG/DL (ref 8.5–10.1)
CHLORIDE SERPL-SCNC: 107 MMOL/L (ref 98–112)
CHOLEST SERPL-MCNC: 194 MG/DL (ref ?–200)
CO2 SERPL-SCNC: 28 MMOL/L (ref 21–32)
CREAT BLD-MCNC: 0.76 MG/DL
EST. AVERAGE GLUCOSE BLD GHB EST-MCNC: 126 MG/DL (ref 68–126)
FASTING PATIENT LIPID ANSWER: YES
FASTING STATUS PATIENT QL REPORTED: YES
GFR SERPLBLD BASED ON 1.73 SQ M-ARVRAT: 97 ML/MIN/1.73M2 (ref 60–?)
GLOBULIN PLAS-MCNC: 3.5 G/DL (ref 2.8–4.4)
GLUCOSE BLD-MCNC: 96 MG/DL (ref 70–99)
HBA1C MFR BLD: 6 % (ref ?–5.7)
HDLC SERPL-MCNC: 61 MG/DL (ref 40–59)
LDLC SERPL CALC-MCNC: 117 MG/DL (ref ?–100)
NONHDLC SERPL-MCNC: 133 MG/DL (ref ?–130)
OSMOLALITY SERPL CALC.SUM OF ELEC: 288 MOSM/KG (ref 275–295)
POTASSIUM SERPL-SCNC: 4.2 MMOL/L (ref 3.5–5.1)
PROT SERPL-MCNC: 7.3 G/DL (ref 6.4–8.2)
SODIUM SERPL-SCNC: 139 MMOL/L (ref 136–145)
T4 FREE SERPL-MCNC: 0.9 NG/DL (ref 0.8–1.7)
TRIGL SERPL-MCNC: 90 MG/DL (ref 30–149)
TSI SER-ACNC: 3.98 MIU/ML (ref 0.36–3.74)
VLDLC SERPL CALC-MCNC: 16 MG/DL (ref 0–30)

## 2022-11-26 PROCEDURE — 36415 COLL VENOUS BLD VENIPUNCTURE: CPT

## 2022-11-26 PROCEDURE — 80053 COMPREHEN METABOLIC PANEL: CPT

## 2022-11-26 PROCEDURE — 84443 ASSAY THYROID STIM HORMONE: CPT

## 2022-11-26 PROCEDURE — 80061 LIPID PANEL: CPT

## 2022-11-26 PROCEDURE — 83036 HEMOGLOBIN GLYCOSYLATED A1C: CPT

## 2022-11-26 PROCEDURE — 84439 ASSAY OF FREE THYROXINE: CPT

## 2022-11-29 ENCOUNTER — VIRTUAL PHONE E/M (OUTPATIENT)
Dept: FAMILY MEDICINE CLINIC | Facility: CLINIC | Age: 47
End: 2022-11-29
Payer: MEDICAID

## 2022-11-29 VITALS — SYSTOLIC BLOOD PRESSURE: 119 MMHG | DIASTOLIC BLOOD PRESSURE: 85 MMHG

## 2022-11-29 DIAGNOSIS — Z12.11 SCREENING FOR COLON CANCER: ICD-10-CM

## 2022-11-29 DIAGNOSIS — E11.59 HYPERTENSION ASSOCIATED WITH DIABETES (HCC): ICD-10-CM

## 2022-11-29 DIAGNOSIS — I15.2 HYPERTENSION ASSOCIATED WITH DIABETES (HCC): ICD-10-CM

## 2022-11-29 DIAGNOSIS — E11.9 CONTROLLED TYPE 2 DIABETES MELLITUS WITHOUT COMPLICATION, WITHOUT LONG-TERM CURRENT USE OF INSULIN (HCC): Primary | ICD-10-CM

## 2022-11-29 DIAGNOSIS — E66.01 MORBID OBESITY WITH BMI OF 40.0-44.9, ADULT (HCC): ICD-10-CM

## 2022-11-29 PROCEDURE — 99214 OFFICE O/P EST MOD 30 MIN: CPT | Performed by: EMERGENCY MEDICINE

## 2022-11-29 RX ORDER — LOSARTAN POTASSIUM 100 MG/1
100 TABLET ORAL DAILY
Qty: 90 TABLET | Refills: 0 | Status: SHIPPED | OUTPATIENT
Start: 2022-11-29

## 2022-11-29 RX ORDER — ATORVASTATIN CALCIUM 20 MG/1
20 TABLET, FILM COATED ORAL NIGHTLY
Qty: 90 TABLET | Refills: 0 | Status: SHIPPED | OUTPATIENT
Start: 2022-11-29

## 2022-11-29 NOTE — PATIENT INSTRUCTIONS
Thank you for choosing [facility]  To Do:  FOR [address]    Continue with metformin  Take atorvastatin daily, repeat lipids in 3 months  Arrange for colonoscopy  Arrange for mammogram  Follow up in February for recheck  Continue follow up with weight loss clinic

## 2022-12-02 ENCOUNTER — PATIENT MESSAGE (OUTPATIENT)
Dept: FAMILY MEDICINE CLINIC | Facility: CLINIC | Age: 47
End: 2022-12-02

## 2022-12-02 DIAGNOSIS — E11.9 CONTROLLED TYPE 2 DIABETES MELLITUS WITHOUT COMPLICATION, WITHOUT LONG-TERM CURRENT USE OF INSULIN (HCC): ICD-10-CM

## 2022-12-02 RX ORDER — ATORVASTATIN CALCIUM 20 MG/1
20 TABLET, FILM COATED ORAL NIGHTLY
Qty: 90 TABLET | Refills: 0 | OUTPATIENT
Start: 2022-12-02

## 2022-12-02 NOTE — TELEPHONE ENCOUNTER
Denied. Atorvastatin was sent on 11/29/22. Sent mychart to pt that 90 day supply was sent to [facility].

## 2022-12-05 NOTE — TELEPHONE ENCOUNTER
From: Liliya James  Sent: 12/2/2022 11:15 PM CST  To: [facility] Clinical Staff  Subject: Refill request    [address] said they did have an order for this RX

## 2022-12-13 ENCOUNTER — PATIENT MESSAGE (OUTPATIENT)
Dept: FAMILY MEDICINE CLINIC | Facility: CLINIC | Age: 47
End: 2022-12-13

## 2022-12-13 DIAGNOSIS — Z12.31 ENCOUNTER FOR SCREENING MAMMOGRAM FOR MALIGNANT NEOPLASM OF BREAST: Primary | ICD-10-CM

## 2022-12-14 NOTE — TELEPHONE ENCOUNTER
From: Jeremie Thakkar  To: Sarita Gutierrez MD  Sent: 12/13/2022 2:21 PM CST  Subject: Mammogram/ Colonoscopy     Dr Dallas Mims can you please send an order for [facility] and colonoscopy to [facility] in [facility]. I now work here and I would be easier for me to get it done there. Or fax it to [phone] please.  THank you   Ildefonso Booker

## 2022-12-15 DIAGNOSIS — E11.9 CONTROLLED TYPE 2 DIABETES MELLITUS WITHOUT COMPLICATION, WITHOUT LONG-TERM CURRENT USE OF INSULIN (HCC): ICD-10-CM

## 2022-12-16 RX ORDER — ATORVASTATIN CALCIUM 20 MG/1
TABLET, FILM COATED ORAL
Qty: 90 TABLET | Refills: 0 | OUTPATIENT
Start: 2022-12-16

## 2023-12-11 ENCOUNTER — PATIENT OUTREACH (OUTPATIENT)
Dept: CASE MANAGEMENT | Age: 48
End: 2023-12-11

## 2023-12-11 NOTE — PROCEDURES
The office order for PCP removal request is Approved and finalized on December 11, 2023.     Thanks,  [facility] [employer]

## (undated) DIAGNOSIS — Z51.81 THERAPEUTIC DRUG MONITORING: ICD-10-CM

## (undated) DIAGNOSIS — I15.2 HYPERTENSION ASSOCIATED WITH DIABETES (HCC): ICD-10-CM

## (undated) DIAGNOSIS — E11.59 HYPERTENSION ASSOCIATED WITH DIABETES (HCC): ICD-10-CM

## (undated) DIAGNOSIS — E66.01 MORBID OBESITY WITH BMI OF 40.0-44.9, ADULT (HCC): ICD-10-CM

## (undated) NOTE — LETTER
Date: 12/3/2018    Patient Name: Jeanette Queen          To Whom it may concern: The above patient was seen at the [facility] for treatment of a medical condition. Please allow the patient to return to work late.  If you have a

## (undated) NOTE — LETTER
Date & Time: 8/10/2020, 10:20 AM  Patient: Devante Lynne  Encounter Provider(s):    ROSANGELA Kendrick       To Whom It May Concern:    Devante Lynne was seen and treated in our department on 8/10/2020.  She should not retur

## (undated) NOTE — Clinical Note
Dear Drea Masters MD Our mutual Lolita Wells  is participating in our medical weight-loss program. We have been working together on making lifestyle changes regarding nutrition, behaviors, and physical activity.   Please feel free to contact me with a

## (undated) NOTE — Clinical Note
I had the pleasure of seeing Piedad Enriquez on 8/8/2018. Please see my attached note.   Elle Orr MD FACS [facility]

## (undated) NOTE — LETTER
Date: 10/1/2018    Patient Name: Caio Correa          To Whom it may concern: This letter has been written at the patient's request. The above patient was seen at the [facility] for treatment of a medical condition.     This patient

## (undated) NOTE — LETTER
Date: 4/24/2019    Patient Name: Erika Rodriguez          To Whom it may concern: The above patient was seen at the [facility] for treatment of a medical condition.     This patient should be excused from attending work from April 24,

## (undated) NOTE — LETTER
[address], [address] [facility], [address] [place] [id number] [phone]            October 23, 2019      Dennis Szymanski [phone]      Dear

## (undated) NOTE — LETTER
[facility] [address] [facility], [address] [place] [address], [address] [place] [address]            December 26, 2018      Valdemar Simon [phone]      Dear Ms. Michelle Sanchez

## (undated) NOTE — LETTER
1/2/2019          To Whom It May Concern:    Erika Rodriguez is currently under my medical care and was seen in our office today 1/2/19.      If you require additional information please contact our office        Sincerely,    ROSANGELA Hester

## (undated) NOTE — LETTER
[address]  [address] [place] [address] Drive  [id number]  [place] [phone] [phone]  [facility]: [phone]  FAX: [phone]   Consent to Procedure/Sedation    Date: __9/24/2019_____    Time: ___11:11 AM ___    1.  I authorize the performanc Signature of Patient:  ___________________________    Signature of person authorized to consent for patient: Relationship to patient:  ___________________________    ___________________    Witness: ___Vicki Leopoldo RN_________________   Date: _____9/24/19

## (undated) NOTE — LETTER
Date: 9/10/2018      Patient Name: Corinne Cabral          To Whom it may concern: The above patient was seen at the [facility] for treatment of a medical condition.         This patient should be excused from attending work on 09/10